# Patient Record
Sex: MALE | Race: ASIAN | NOT HISPANIC OR LATINO
[De-identification: names, ages, dates, MRNs, and addresses within clinical notes are randomized per-mention and may not be internally consistent; named-entity substitution may affect disease eponyms.]

---

## 2019-11-06 ENCOUNTER — APPOINTMENT (OUTPATIENT)
Dept: CARDIOLOGY | Facility: CLINIC | Age: 49
End: 2019-11-06
Payer: COMMERCIAL

## 2019-11-06 VITALS
BODY MASS INDEX: 27.83 KG/M2 | WEIGHT: 210 LBS | SYSTOLIC BLOOD PRESSURE: 124 MMHG | RESPIRATION RATE: 16 BRPM | HEIGHT: 73 IN | HEART RATE: 59 BPM | DIASTOLIC BLOOD PRESSURE: 76 MMHG

## 2019-11-06 DIAGNOSIS — I42.1 OBSTRUCTIVE HYPERTROPHIC CARDIOMYOPATHY: ICD-10-CM

## 2019-11-06 DIAGNOSIS — I10 ESSENTIAL (PRIMARY) HYPERTENSION: ICD-10-CM

## 2019-11-06 DIAGNOSIS — E11.9 TYPE 2 DIABETES MELLITUS W/OUT COMPLICATIONS: ICD-10-CM

## 2019-11-06 PROBLEM — Z00.00 ENCOUNTER FOR PREVENTIVE HEALTH EXAMINATION: Status: ACTIVE | Noted: 2019-11-06

## 2019-11-06 PROCEDURE — 99204 OFFICE O/P NEW MOD 45 MIN: CPT

## 2019-11-06 RX ORDER — CARVEDILOL 25 MG/1
25 TABLET, FILM COATED ORAL
Refills: 0 | Status: ACTIVE | COMMUNITY
Start: 2019-11-06

## 2019-11-06 RX ORDER — METFORMIN HYDROCHLORIDE 500 MG/1
500 TABLET, COATED ORAL
Refills: 0 | Status: ACTIVE | COMMUNITY
Start: 2019-11-06

## 2019-11-06 RX ORDER — AMLODIPINE BESYLATE 10 MG/1
10 TABLET ORAL
Refills: 0 | Status: ACTIVE | COMMUNITY
Start: 2019-11-06

## 2019-11-06 RX ORDER — ASPIRIN 81 MG/1
81 TABLET ORAL
Refills: 0 | Status: ACTIVE | COMMUNITY
Start: 2019-11-06

## 2019-11-06 NOTE — HISTORY OF PRESENT ILLNESS
[FreeTextEntry1] : 1. HTN: on medications.\par 2. HCM: patient told he had HCM in 2007. Has been on BB since.\par He also had cath in 2007 that showed non obstructive CAD.\par He recently had nephrectomy.\par He has mild SOB with exertion and intermittent chest pressure without radiation lasting minutes. ET is limited post surgery.\par Symptoms have been present for about two months and occur several times/ week.

## 2019-11-06 NOTE — REASON FOR VISIT
[Initial Evaluation] : an initial evaluation of [Hypertension] : hypertension [FreeTextEntry1] : 49 M HTN DM ? hx of HOCM with CP and SOB.

## 2019-11-06 NOTE — PHYSICAL EXAM
[General Appearance - Well Developed] : well developed [Normal Appearance] : normal appearance [Well Groomed] : well groomed [General Appearance - Well Nourished] : well nourished [No Deformities] : no deformities [General Appearance - In No Acute Distress] : no acute distress [Normal Conjunctiva] : the conjunctiva exhibited no abnormalities [Eyelids - No Xanthelasma] : the eyelids demonstrated no xanthelasmas [Normal Oral Mucosa] : normal oral mucosa [No Oral Pallor] : no oral pallor [No Oral Cyanosis] : no oral cyanosis [Normal Jugular Venous A Waves Present] : normal jugular venous A waves present [Normal Jugular Venous V Waves Present] : normal jugular venous V waves present [No Jugular Venous Lino A Waves] : no jugular venous lino A waves [Heart Rate And Rhythm] : heart rate and rhythm were normal [Heart Sounds] : normal S1 and S2 [FreeTextEntry1] : 2/6 EMILIE NAPIER  [Respiration, Rhythm And Depth] : normal respiratory rhythm and effort [Exaggerated Use Of Accessory Muscles For Inspiration] : no accessory muscle use [Auscultation Breath Sounds / Voice Sounds] : lungs were clear to auscultation bilaterally [Abdomen Soft] : soft [Abdomen Tenderness] : non-tender [Abdomen Mass (___ Cm)] : no abdominal mass palpated [Nail Clubbing] : no clubbing of the fingernails [Cyanosis, Localized] : no localized cyanosis [Petechial Hemorrhages (___cm)] : no petechial hemorrhages [Skin Color & Pigmentation] : normal skin color and pigmentation [] : no rash [No Venous Stasis] : no venous stasis [Skin Lesions] : no skin lesions [No Skin Ulcers] : no skin ulcer [No Xanthoma] : no  xanthoma was observed [Oriented To Time, Place, And Person] : oriented to person, place, and time [Affect] : the affect was normal [Mood] : the mood was normal [No Anxiety] : not feeling anxious

## 2019-11-06 NOTE — DISCUSSION/SUMMARY
[FreeTextEntry1] : 49 M HCM HTN DM with CP and SOB.\par CHECK ECHOCARDIOGRAM.\par Continue meds for HTN.\par Continue ASA.

## 2019-11-20 ENCOUNTER — APPOINTMENT (OUTPATIENT)
Dept: CARDIOLOGY | Facility: CLINIC | Age: 49
End: 2019-11-20
Payer: COMMERCIAL

## 2019-11-20 VITALS
WEIGHT: 212 LBS | RESPIRATION RATE: 12 BRPM | BODY MASS INDEX: 28.1 KG/M2 | HEART RATE: 61 BPM | HEIGHT: 73 IN | DIASTOLIC BLOOD PRESSURE: 80 MMHG | SYSTOLIC BLOOD PRESSURE: 132 MMHG

## 2019-11-20 DIAGNOSIS — E78.5 HYPERLIPIDEMIA, UNSPECIFIED: ICD-10-CM

## 2019-11-20 PROCEDURE — 99214 OFFICE O/P EST MOD 30 MIN: CPT

## 2019-11-20 RX ORDER — LOSARTAN POTASSIUM AND HYDROCHLOROTHIAZIDE 100; 12.5 MG/1; MG/1
100-12.5 TABLET, FILM COATED ORAL
Refills: 0 | Status: ACTIVE | COMMUNITY
Start: 2019-11-06

## 2019-11-20 NOTE — DISCUSSION/SUMMARY
[FreeTextEntry1] : 49 M HTN hyperlipidemia DM HCM for f/u.\par Patient's echo reviewed.\par Continue current medications for HTN and hyperlipidemia.\par Continue DM control.\par Patient will have f/u in [facility] area. Suggested they may consider stopping amlodipine if BP well controlled there.

## 2019-11-20 NOTE — REASON FOR VISIT
[Follow-Up - Clinic] : a clinic follow-up of [Hyperlipidemia] : hyperlipidemia [Hypertension] : hypertension [FreeTextEntry1] : 49 M HTN hyperlipidemia DM hx of HOCM for f/u.\par

## 2019-11-20 NOTE — HISTORY OF PRESENT ILLNESS
[FreeTextEntry1] : 1. HTN: on medications.\par 2. HCM: patient told he had HCM in 2007. Has been on BB since.\par He also had cath in 2007 that showed non obstructive CAD.\par He recently had nephrectomy for RCC.\par 3. Hyperlipidemia: on statin.

## 2019-11-20 NOTE — PHYSICAL EXAM
[General Appearance - Well Developed] : well developed [Normal Appearance] : normal appearance [No Deformities] : no deformities [Well Groomed] : well groomed [General Appearance - Well Nourished] : well nourished [Eyelids - No Xanthelasma] : the eyelids demonstrated no xanthelasmas [General Appearance - In No Acute Distress] : no acute distress [Normal Conjunctiva] : the conjunctiva exhibited no abnormalities [Normal Oral Mucosa] : normal oral mucosa [No Oral Pallor] : no oral pallor [No Oral Cyanosis] : no oral cyanosis [Normal Jugular Venous A Waves Present] : normal jugular venous A waves present [Normal Jugular Venous V Waves Present] : normal jugular venous V waves present [No Jugular Venous Lino A Waves] : no jugular venous lino A waves [Respiration, Rhythm And Depth] : normal respiratory rhythm and effort [Exaggerated Use Of Accessory Muscles For Inspiration] : no accessory muscle use [Auscultation Breath Sounds / Voice Sounds] : lungs were clear to auscultation bilaterally [Heart Sounds] : normal S1 and S2 [FreeTextEntry1] : 2/6 EMILIE NAPIER  [Heart Rate And Rhythm] : heart rate and rhythm were normal [Abdomen Tenderness] : non-tender [Abdomen Soft] : soft [Abdomen Mass (___ Cm)] : no abdominal mass palpated [Nail Clubbing] : no clubbing of the fingernails [Cyanosis, Localized] : no localized cyanosis [Petechial Hemorrhages (___cm)] : no petechial hemorrhages [] : no rash [Skin Color & Pigmentation] : normal skin color and pigmentation [No Venous Stasis] : no venous stasis [Skin Lesions] : no skin lesions [No Skin Ulcers] : no skin ulcer [No Xanthoma] : no  xanthoma was observed [Oriented To Time, Place, And Person] : oriented to person, place, and time [Mood] : the mood was normal [No Anxiety] : not feeling anxious [Affect] : the affect was normal

## 2021-06-27 ENCOUNTER — INPATIENT (INPATIENT)
Facility: HOSPITAL | Age: 51
LOS: 2 days | Discharge: HOME | DRG: 637 | End: 2021-06-30
Attending: INTERNAL MEDICINE | Admitting: STUDENT IN AN ORGANIZED HEALTH CARE EDUCATION/TRAINING PROGRAM
Payer: COMMERCIAL

## 2021-06-27 VITALS
WEIGHT: 220.02 LBS | HEART RATE: 67 BPM | OXYGEN SATURATION: 98 % | RESPIRATION RATE: 18 BRPM | TEMPERATURE: 98 F | HEIGHT: 73 IN | SYSTOLIC BLOOD PRESSURE: 198 MMHG | DIASTOLIC BLOOD PRESSURE: 83 MMHG

## 2021-06-27 DIAGNOSIS — E16.2 HYPOGLYCEMIA, UNSPECIFIED: ICD-10-CM

## 2021-06-27 LAB
ALBUMIN SERPL ELPH-MCNC: 3.7 G/DL — SIGNIFICANT CHANGE UP (ref 3.3–5)
ALP SERPL-CCNC: 51 U/L — SIGNIFICANT CHANGE UP (ref 40–120)
ALT FLD-CCNC: 37 U/L — SIGNIFICANT CHANGE UP (ref 10–45)
ANION GAP SERPL CALC-SCNC: 15 MMOL/L — SIGNIFICANT CHANGE UP (ref 5–17)
AST SERPL-CCNC: 25 U/L — SIGNIFICANT CHANGE UP (ref 10–40)
BASOPHILS # BLD AUTO: 0.02 K/UL — SIGNIFICANT CHANGE UP (ref 0–0.2)
BASOPHILS NFR BLD AUTO: 0.2 % — SIGNIFICANT CHANGE UP (ref 0–2)
BILIRUB SERPL-MCNC: 0.4 MG/DL — SIGNIFICANT CHANGE UP (ref 0.2–1.2)
BUN SERPL-MCNC: 70 MG/DL — HIGH (ref 7–23)
CALCIUM SERPL-MCNC: 8.7 MG/DL — SIGNIFICANT CHANGE UP (ref 8.4–10.5)
CHLORIDE SERPL-SCNC: 98 MMOL/L — SIGNIFICANT CHANGE UP (ref 96–108)
CO2 SERPL-SCNC: 21 MMOL/L — LOW (ref 22–31)
CREAT SERPL-MCNC: 2.43 MG/DL — HIGH (ref 0.5–1.3)
EOSINOPHIL # BLD AUTO: 0 K/UL — SIGNIFICANT CHANGE UP (ref 0–0.5)
EOSINOPHIL NFR BLD AUTO: 0 % — SIGNIFICANT CHANGE UP (ref 0–6)
GLUCOSE SERPL-MCNC: 152 MG/DL — HIGH (ref 70–99)
HCT VFR BLD CALC: 40.9 % — SIGNIFICANT CHANGE UP (ref 39–50)
HGB BLD-MCNC: 13.7 G/DL — SIGNIFICANT CHANGE UP (ref 13–17)
IMM GRANULOCYTES NFR BLD AUTO: 1.9 % — HIGH (ref 0–1.5)
LYMPHOCYTES # BLD AUTO: 0.53 K/UL — LOW (ref 1–3.3)
LYMPHOCYTES # BLD AUTO: 4.8 % — LOW (ref 13–44)
MCHC RBC-ENTMCNC: 29.1 PG — SIGNIFICANT CHANGE UP (ref 27–34)
MCHC RBC-ENTMCNC: 33.5 GM/DL — SIGNIFICANT CHANGE UP (ref 32–36)
MCV RBC AUTO: 86.8 FL — SIGNIFICANT CHANGE UP (ref 80–100)
MONOCYTES # BLD AUTO: 0.3 K/UL — SIGNIFICANT CHANGE UP (ref 0–0.9)
MONOCYTES NFR BLD AUTO: 2.7 % — SIGNIFICANT CHANGE UP (ref 2–14)
NEUTROPHILS # BLD AUTO: 9.96 K/UL — HIGH (ref 1.8–7.4)
NEUTROPHILS NFR BLD AUTO: 90.4 % — HIGH (ref 43–77)
NRBC # BLD: 0 /100 WBCS — SIGNIFICANT CHANGE UP (ref 0–0)
PLATELET # BLD AUTO: 264 K/UL — SIGNIFICANT CHANGE UP (ref 150–400)
POTASSIUM SERPL-MCNC: 3.8 MMOL/L — SIGNIFICANT CHANGE UP (ref 3.5–5.3)
POTASSIUM SERPL-SCNC: 3.8 MMOL/L — SIGNIFICANT CHANGE UP (ref 3.5–5.3)
PROT SERPL-MCNC: 6.2 G/DL — SIGNIFICANT CHANGE UP (ref 6–8.3)
RBC # BLD: 4.71 M/UL — SIGNIFICANT CHANGE UP (ref 4.2–5.8)
RBC # FLD: 12.8 % — SIGNIFICANT CHANGE UP (ref 10.3–14.5)
SARS-COV-2 RNA SPEC QL NAA+PROBE: SIGNIFICANT CHANGE UP
SODIUM SERPL-SCNC: 134 MMOL/L — LOW (ref 135–145)
WBC # BLD: 11.02 K/UL — HIGH (ref 3.8–10.5)
WBC # FLD AUTO: 11.02 K/UL — HIGH (ref 3.8–10.5)

## 2021-06-27 PROCEDURE — 99223 1ST HOSP IP/OBS HIGH 75: CPT

## 2021-06-27 PROCEDURE — 93010 ELECTROCARDIOGRAM REPORT: CPT

## 2021-06-27 PROCEDURE — 99285 EMERGENCY DEPT VISIT HI MDM: CPT

## 2021-06-27 RX ORDER — DEXTROSE 50 % IN WATER 50 %
50 SYRINGE (ML) INTRAVENOUS ONCE
Refills: 0 | Status: COMPLETED | OUTPATIENT
Start: 2021-06-27 | End: 2021-06-27

## 2021-06-27 RX ORDER — SODIUM CHLORIDE 9 MG/ML
1000 INJECTION, SOLUTION INTRAVENOUS
Refills: 0 | Status: COMPLETED | OUTPATIENT
Start: 2021-06-27 | End: 2021-06-28

## 2021-06-27 RX ORDER — OCTREOTIDE ACETATE 200 UG/ML
50 INJECTION, SOLUTION INTRAVENOUS; SUBCUTANEOUS ONCE
Refills: 0 | Status: COMPLETED | OUTPATIENT
Start: 2021-06-27 | End: 2021-06-27

## 2021-06-27 RX ORDER — SODIUM CHLORIDE 9 MG/ML
1000 INJECTION, SOLUTION INTRAVENOUS
Refills: 0 | Status: DISCONTINUED | OUTPATIENT
Start: 2021-06-27 | End: 2021-06-28

## 2021-06-27 RX ORDER — CARVEDILOL PHOSPHATE 80 MG/1
25 CAPSULE, EXTENDED RELEASE ORAL ONCE
Refills: 0 | Status: DISCONTINUED | OUTPATIENT
Start: 2021-06-27 | End: 2021-06-28

## 2021-06-27 RX ADMIN — Medication 50 MILLILITER(S): at 20:32

## 2021-06-27 RX ADMIN — OCTREOTIDE ACETATE 50 MICROGRAM(S): 200 INJECTION, SOLUTION INTRAVENOUS; SUBCUTANEOUS at 23:08

## 2021-06-27 RX ADMIN — SODIUM CHLORIDE 1000 MILLILITER(S): 9 INJECTION, SOLUTION INTRAVENOUS at 20:30

## 2021-06-27 NOTE — H&P ADULT - ASSESSMENT
51 yo M with PMH of HTN, HLD, Hypothyroidism, DMII, Renal cell ca s/p right nephrectomy, currently on prednisone, CKD, ?irregular heart beats, presents here after syncopal episode.

## 2021-06-27 NOTE — ED ADULT NURSE NOTE - OBJECTIVE STATEMENT
51y/o male aaox4 h/o kidney CA, borderline DM  presents to the ED via EMS from home c/o hypoglycemia . Pt states that today while he was driving to his family house he felt disoriented, at his family house sudden he felt dizzy and  LOC,  family called EMS. As per pt states , "I think I was dreaming, when I saw the EMS" pt felt disoriented, lightheaded. AS per EMS when arrived  to pt's house pt was disoriented , FS 51 given 250 ML /D10, repeated Fs w/ 185 and brought to Ed for evaluation, Pt reports taking prednisone 30mg per day last dose was this morning.   Pt at this time denies fever, chills, n/v, abd pain, diarrhea/constipation, numbness/tingling, urinary symptoms , in no respiratory distress, no CP, PT safety maintained with family at bedside, call bell within reach and bed in the lowest position.

## 2021-06-27 NOTE — ED ADULT NURSE REASSESSMENT NOTE - NS ED NURSE REASSESS COMMENT FT1
pt c/o feeling nauseated and diaprotetic and dizzy. FS 36. 1amp D50 given to pt and IVF started. will reassess FS in 15 mins. MD Omi Rajput aware.

## 2021-06-27 NOTE — ED PROVIDER NOTE - CLINICAL SUMMARY MEDICAL DECISION MAKING FREE TEXT BOX
50y male with history of renal CA s/p kidney resection and now with worsening kidney disease on glipizide with episode of hypoglycemia. Likely from glipizide, decreased clearance because of decreased kidney function. Will check kidney function and obs. 50y male with history of renal CA s/p kidney resection and now with worsening kidney disease on glipizide with episode of hypoglycemia. Likely from glipizide, decreased clearance because of decreased kidney function. Will check kidney function and obs. ZR

## 2021-06-27 NOTE — ED PROVIDER NOTE - NS ED ROS FT
Gen: No fever, No chills  Eyes: No vision changes   ENT: No congestion, sore throat  Resp: No cough. No SOB  Cardiovascular: No chest pain. No palpitations  GI: No abdominal pain, nausea/vomiting   :  No change in urine output or frequency; no dysuria  MS: No joint. No muscle pain  Skin: No rashes  Neuro: No headache; No numbness or weakness  Remainder negative, except as per the HPI

## 2021-06-27 NOTE — H&P ADULT - NSICDXPASTMEDICALHX_GEN_ALL_CORE_FT
PAST MEDICAL HISTORY:  DM2 (diabetes mellitus, type 2)     HLD (hyperlipidemia)     Hypothyroidism     Renal cell cancer

## 2021-06-27 NOTE — H&P ADULT - NSHPLABSRESULTS_GEN_ALL_CORE
Labs personally reviewed:                          13.7   11.02 )-----------( 264      ( 27 Jun 2021 19:10 )             40.9     06-27    134<L>  |  98  |  70<H>  ----------------------------<  152<H>  3.8   |  21<L>  |  2.43<H>    Ca    8.7      27 Jun 2021 19:10    TPro  6.2  /  Alb  3.7  /  TBili  0.4  /  DBili  x   /  AST  25  /  ALT  37  /  AlkPhos  51  06-27        LIVER FUNCTIONS - ( 27 Jun 2021 19:10 )  Alb: 3.7 g/dL / Pro: 6.2 g/dL / ALK PHOS: 51 U/L / ALT: 37 U/L / AST: 25 U/L / GGT: x               CAPILLARY BLOOD GLUCOSE      POCT Blood Glucose.: 70 mg/dL (28 Jun 2021 00:10)  POCT Blood Glucose.: 128 mg/dL (27 Jun 2021 22:57)  POCT Blood Glucose.: 210 mg/dL (27 Jun 2021 20:52)  POCT Blood Glucose.: 36 mg/dL (27 Jun 2021 20:31)  POCT Blood Glucose.: 136 mg/dL (27 Jun 2021 18:41)  POCT Blood Glucose.: 185 mg/dL (27 Jun 2021 18:27)      Imaging:  CXR personally reviewed: no focal opacity    EKG personally reviewed: nsr, no acute st changes

## 2021-06-27 NOTE — ED PROVIDER NOTE - OBJECTIVE STATEMENT
50y male with PMH of renal CA s/p right kidney resection, mets s/p immunotherapy presenting with hypoglycemia. States that he had felt disoriented prior to passing out, finger stick 51 on scene, was given 250ml of D10 and was back to baseline. States that his Cr has been getting worse since immunotherapy. Takes glipizide BID, last took this AM. No associated symptoms.

## 2021-06-27 NOTE — H&P ADULT - NSHPREVIEWOFSYSTEMS_GEN_ALL_CORE
CONSTITUTIONAL: No weakness, fevers or chills  EYES/ENT: No visual changes;  No dysphagia  NECK: No pain or stiffness  RESPIRATORY: No cough, wheezing, hemoptysis; No shortness of breath  CARDIOVASCULAR: No chest pain or palpitations; No lower extremity edema  EXTREMITIES: no le edema, cyanosis, clubbing  MUSCULOSKELETAL: no joint pain, swelling  GASTROINTESTINAL: No abdominal or epigastric pain. No nausea, vomiting, or hematemesis; No diarrhea or constipation. No melena or hematochezia.  BACK: No back pain  GENITOURINARY: No dysuria, frequency or hematuria  NEUROLOGICAL: No numbness or weakness  SKIN: No itching, burning, rashes, or lesions   PSYCH: no agitation  All other review of systems is negative unless indicated above.

## 2021-06-27 NOTE — H&P ADULT - PROBLEM SELECTOR PLAN 1
Patient with recurrent hypoglycemia in setting of sulfonylurea use, no intentional overdose   --> 128 --> 70  s/o octreotide 50mcg x 1; will start octreotide gtt at 50mcg/hr for now  patient is also on prednisone  start D5W @75 cc/hr for now  monitor FS q1hr until 3 normal values  hold glipizide  endocrine consult in AM

## 2021-06-27 NOTE — ED PROVIDER NOTE - PHYSICAL EXAMINATION
Gen: AAOx3, non-toxic  Head: NCAT  HEENT: oral mucosa moist, normal conjunctiva  Lung: CTAB, no respiratory distress, speaking in full sentences  CV: RRR, no murmurs, rubs or gallops  Abd: soft, NTND, no guarding   MSK: no visible deformities  Neuro: No focal sensory or motor deficits  Skin: Warm, well perfused, no rash  Psych: normal affect.   ~Deon Jasbir PGY3

## 2021-06-27 NOTE — H&P ADULT - PROBLEM SELECTOR PLAN 5
s/p right nephrectomy  was on immunotherapy, that was recently discontinued, currently on prednisone taper  monitor for now  f/u as outaptient

## 2021-06-27 NOTE — H&P ADULT - HISTORY OF PRESENT ILLNESS
49 yo M with PMH of HTN, HLD, Hypothyroidism, DMII, Renal cell ca s/p right nephrectomy, CKD, ?irregular heart beats, presents here after syncopal episode.  Patient was at a [employer] party today when he started feeling sweaty, lightheaded, overall not feeling well.  He then lay down on the couch to rest, next thing he remembers is EMS was there trying to wake up him.  He was told that he passed out.  His finger stick at the time was 51.  He was given D50.  In ED, patient had another episode of sweating and lightheadedness.  Repeat blood sugar was 36.  Patient was then given 1L D5 NS, octreotide.  Currently patient denies any complaints.  His FS at this time is 128.  Patient otherwise denies fever, chills, cough, SOB, nausea, vomiting, diarrhea.  He never had syncopal episode in the past.  He denies having chest pain, palpitation, prior to the syncopal episode.   51 yo M with PMH of HTN, HLD, Hypothyroidism, DMII, Renal cell ca s/p right nephrectomy, currently on prednisone, CKD, ?irregular heart beats, presents here after syncopal episode.  Patient was at a [employer] party today when he started feeling sweaty, lightheaded, overall not feeling well.  He then lay down on the couch to rest, next thing he remembers is EMS was there trying to wake up him.  He was told that he passed out.  His finger stick at the time was 51.  He was given D50.  In ED, patient had another episode of sweating and lightheadedness.  Repeat blood sugar was 36.  Patient was then given 1L D5 NS, octreotide.  Currently patient denies any complaints.  His FS at this time is 128.  Patient otherwise denies fever, chills, cough, SOB, nausea, vomiting, diarrhea.  He never had syncopal episode in the past.  He denies having chest pain, palpitation, prior to the syncopal episode.   51 yo M with PMH of HTN, HLD, Hypothyroidism, DMII, Renal cell ca s/p right nephrectomy, currently on prednisone, CKD, ?irregular heart beats, presents here after syncopal episode.  Patient was at a [employer] party today when he started feeling sweaty, lightheaded, overall not feeling well.  He then lay down on the couch to rest, next thing he remembers is EMS was there trying to wake up him.  He was told that he passed out.  His finger stick at the time was 51.  In ED, patient had another episode of sweating and lightheadedness.  Repeat blood sugar was 36.  Patient was then given 1L D5 NS, octreotide.  Currently patient denies any complaints.  His FS at this time is 128.  Patient otherwise denies fever, chills, cough, SOB, nausea, vomiting, diarrhea.  He never had syncopal episode in the past.  He denies having chest pain, palpitation, prior to the syncopal episode.

## 2021-06-27 NOTE — ED ADULT TRIAGE NOTE - ESI TRIAGE ACUITY LEVEL, MLM
celeste's Personal History/Story    SEEN IN [facility] SINCE 3/2012. CHRONIC BACK PAIN MANY YEARS. NO INJURY OR   TRAUMA.    HX OF INJECTIONS ORAL PAINMEDS  What Should We Know About You or Your Family to Care for You    SINGLE, HAS A DOG   LIVE WITH A PERSONAL ASS
3

## 2021-06-27 NOTE — H&P ADULT - NSHPPHYSICALEXAM_GEN_ALL_CORE
PHYSICAL EXAM:  Vital Signs Last 24 Hrs  T(C): 36.6 (06-27-21 @ 22:13)  T(F): 97.8 (06-27-21 @ 22:13), Max: 98 (06-27-21 @ 18:26)  HR: 58 (06-28-21 @ 00:07) (58 - 72)  BP: 157/90 (06-28-21 @ 00:07)  BP(mean): --  RR: 18 (06-28-21 @ 00:07) (16 - 18)  SpO2: 99% (06-28-21 @ 00:07) (98% - 100%)  Wt(kg): --    Constitutional: NAD, awake and alert  EYES: EOMI  ENT:  Normal Hearing, no tonsillar exudates   Neck: Soft and supple, No JVD  Lungs: Breath sounds are clear bilaterally, No wheezing, rales or rhonchi  Heart: S1 and S2, regular rate and rhythm, no Murmurs, gallops or rubs  Abdomen: Bowel Sounds present, soft, nontender, nondistended, no guarding, no rebound  Extremities: No cyanosis or clubbing; warm to touch  Vascular: 2+ peripheral pulses lower ex  Neurological: A/O x 3, no focal deficits  Musculoskeletal: 5/5 strength b/l upper and lower extremities  Skin: No rashes  Psych: no depression or anhedonia  HEME: no bruises, no nose bleeds

## 2021-06-27 NOTE — H&P ADULT - PROBLEM SELECTOR PLAN 3
creatinine improving per patient  creatinine 2.9 on June 14, prior to that was 3.6  monitor BMP closely  c/w home meds including losartan

## 2021-06-28 DIAGNOSIS — E78.5 HYPERLIPIDEMIA, UNSPECIFIED: ICD-10-CM

## 2021-06-28 DIAGNOSIS — C64.9 MALIGNANT NEOPLASM OF UNSPECIFIED KIDNEY, EXCEPT RENAL PELVIS: ICD-10-CM

## 2021-06-28 DIAGNOSIS — Z90.5 ACQUIRED ABSENCE OF KIDNEY: Chronic | ICD-10-CM

## 2021-06-28 DIAGNOSIS — E16.2 HYPOGLYCEMIA, UNSPECIFIED: ICD-10-CM

## 2021-06-28 DIAGNOSIS — N18.9 CHRONIC KIDNEY DISEASE, UNSPECIFIED: ICD-10-CM

## 2021-06-28 DIAGNOSIS — Z98.890 OTHER SPECIFIED POSTPROCEDURAL STATES: Chronic | ICD-10-CM

## 2021-06-28 DIAGNOSIS — E03.9 HYPOTHYROIDISM, UNSPECIFIED: ICD-10-CM

## 2021-06-28 DIAGNOSIS — E11.9 TYPE 2 DIABETES MELLITUS WITHOUT COMPLICATIONS: ICD-10-CM

## 2021-06-28 DIAGNOSIS — Z29.9 ENCOUNTER FOR PROPHYLACTIC MEASURES, UNSPECIFIED: ICD-10-CM

## 2021-06-28 LAB
ALBUMIN SERPL ELPH-MCNC: 2.8 G/DL — LOW (ref 3.3–5)
ALP SERPL-CCNC: 38 U/L — LOW (ref 40–120)
ALT FLD-CCNC: 27 U/L — SIGNIFICANT CHANGE UP (ref 10–45)
ANION GAP SERPL CALC-SCNC: 13 MMOL/L — SIGNIFICANT CHANGE UP (ref 5–17)
AST SERPL-CCNC: 16 U/L — SIGNIFICANT CHANGE UP (ref 10–40)
BASOPHILS # BLD AUTO: 0.01 K/UL — SIGNIFICANT CHANGE UP (ref 0–0.2)
BASOPHILS NFR BLD AUTO: 0.1 % — SIGNIFICANT CHANGE UP (ref 0–2)
BILIRUB SERPL-MCNC: 0.5 MG/DL — SIGNIFICANT CHANGE UP (ref 0.2–1.2)
BUN SERPL-MCNC: 62 MG/DL — HIGH (ref 7–23)
CALCIUM SERPL-MCNC: 7.9 MG/DL — LOW (ref 8.4–10.5)
CHLORIDE SERPL-SCNC: 106 MMOL/L — SIGNIFICANT CHANGE UP (ref 96–108)
CO2 SERPL-SCNC: 18 MMOL/L — LOW (ref 22–31)
CREAT SERPL-MCNC: 2.5 MG/DL — HIGH (ref 0.5–1.3)
EOSINOPHIL # BLD AUTO: 0.03 K/UL — SIGNIFICANT CHANGE UP (ref 0–0.5)
EOSINOPHIL NFR BLD AUTO: 0.3 % — SIGNIFICANT CHANGE UP (ref 0–6)
GLUCOSE BLDC GLUCOMTR-MCNC: 225 MG/DL — HIGH (ref 70–99)
GLUCOSE BLDC GLUCOMTR-MCNC: 277 MG/DL — HIGH (ref 70–99)
GLUCOSE BLDC GLUCOMTR-MCNC: 304 MG/DL — HIGH (ref 70–99)
GLUCOSE SERPL-MCNC: 171 MG/DL — HIGH (ref 70–99)
HCT VFR BLD CALC: 34 % — LOW (ref 39–50)
HGB BLD-MCNC: 11.6 G/DL — LOW (ref 13–17)
IMM GRANULOCYTES NFR BLD AUTO: 1.2 % — SIGNIFICANT CHANGE UP (ref 0–1.5)
LYMPHOCYTES # BLD AUTO: 1.89 K/UL — SIGNIFICANT CHANGE UP (ref 1–3.3)
LYMPHOCYTES # BLD AUTO: 20.1 % — SIGNIFICANT CHANGE UP (ref 13–44)
MAGNESIUM SERPL-MCNC: 2.1 MG/DL — SIGNIFICANT CHANGE UP (ref 1.6–2.6)
MCHC RBC-ENTMCNC: 29.7 PG — SIGNIFICANT CHANGE UP (ref 27–34)
MCHC RBC-ENTMCNC: 34.1 GM/DL — SIGNIFICANT CHANGE UP (ref 32–36)
MCV RBC AUTO: 87.2 FL — SIGNIFICANT CHANGE UP (ref 80–100)
MONOCYTES # BLD AUTO: 0.8 K/UL — SIGNIFICANT CHANGE UP (ref 0–0.9)
MONOCYTES NFR BLD AUTO: 8.5 % — SIGNIFICANT CHANGE UP (ref 2–14)
NEUTROPHILS # BLD AUTO: 6.57 K/UL — SIGNIFICANT CHANGE UP (ref 1.8–7.4)
NEUTROPHILS NFR BLD AUTO: 69.8 % — SIGNIFICANT CHANGE UP (ref 43–77)
NRBC # BLD: 0 /100 WBCS — SIGNIFICANT CHANGE UP (ref 0–0)
PHOSPHATE SERPL-MCNC: 4.4 MG/DL — SIGNIFICANT CHANGE UP (ref 2.5–4.5)
PLATELET # BLD AUTO: 222 K/UL — SIGNIFICANT CHANGE UP (ref 150–400)
POTASSIUM SERPL-MCNC: 3.6 MMOL/L — SIGNIFICANT CHANGE UP (ref 3.5–5.3)
POTASSIUM SERPL-SCNC: 3.6 MMOL/L — SIGNIFICANT CHANGE UP (ref 3.5–5.3)
PROT SERPL-MCNC: 4.8 G/DL — LOW (ref 6–8.3)
RBC # BLD: 3.9 M/UL — LOW (ref 4.2–5.8)
RBC # FLD: 13.1 % — SIGNIFICANT CHANGE UP (ref 10.3–14.5)
SODIUM SERPL-SCNC: 137 MMOL/L — SIGNIFICANT CHANGE UP (ref 135–145)
WBC # BLD: 9.41 K/UL — SIGNIFICANT CHANGE UP (ref 3.8–10.5)
WBC # FLD AUTO: 9.41 K/UL — SIGNIFICANT CHANGE UP (ref 3.8–10.5)

## 2021-06-28 PROCEDURE — 99223 1ST HOSP IP/OBS HIGH 75: CPT

## 2021-06-28 PROCEDURE — 93010 ELECTROCARDIOGRAM REPORT: CPT

## 2021-06-28 RX ORDER — BACITRACIN ZINC 500 UNIT/G
1 OINTMENT IN PACKET (EA) TOPICAL
Refills: 0 | Status: DISCONTINUED | OUTPATIENT
Start: 2021-06-28 | End: 2021-06-30

## 2021-06-28 RX ORDER — CARVEDILOL PHOSPHATE 80 MG/1
0 CAPSULE, EXTENDED RELEASE ORAL
Qty: 0 | Refills: 0 | DISCHARGE

## 2021-06-28 RX ORDER — OCTREOTIDE ACETATE 200 UG/ML
50 INJECTION, SOLUTION INTRAVENOUS; SUBCUTANEOUS
Qty: 500 | Refills: 0 | Status: DISCONTINUED | OUTPATIENT
Start: 2021-06-28 | End: 2021-06-28

## 2021-06-28 RX ORDER — INSULIN LISPRO 100/ML
VIAL (ML) SUBCUTANEOUS EVERY 4 HOURS
Refills: 0 | Status: DISCONTINUED | OUTPATIENT
Start: 2021-06-28 | End: 2021-06-29

## 2021-06-28 RX ORDER — ATORVASTATIN CALCIUM 80 MG/1
1 TABLET, FILM COATED ORAL
Qty: 0 | Refills: 0 | DISCHARGE

## 2021-06-28 RX ORDER — LOSARTAN POTASSIUM 100 MG/1
50 TABLET, FILM COATED ORAL DAILY
Refills: 0 | Status: DISCONTINUED | OUTPATIENT
Start: 2021-06-28 | End: 2021-06-29

## 2021-06-28 RX ORDER — LOSARTAN POTASSIUM 100 MG/1
0 TABLET, FILM COATED ORAL
Qty: 0 | Refills: 0 | DISCHARGE

## 2021-06-28 RX ORDER — GABAPENTIN 400 MG/1
100 CAPSULE ORAL DAILY
Refills: 0 | Status: DISCONTINUED | OUTPATIENT
Start: 2021-06-28 | End: 2021-06-30

## 2021-06-28 RX ORDER — CYCLOSPORINE 0.5 MG/ML
0 EMULSION OPHTHALMIC
Qty: 0 | Refills: 0 | DISCHARGE

## 2021-06-28 RX ORDER — DEXTROSE 50 % IN WATER 50 %
50 SYRINGE (ML) INTRAVENOUS ONCE
Refills: 0 | Status: COMPLETED | OUTPATIENT
Start: 2021-06-28 | End: 2021-06-28

## 2021-06-28 RX ORDER — HYDROXYZINE HCL 10 MG
1 TABLET ORAL
Qty: 0 | Refills: 0 | DISCHARGE

## 2021-06-28 RX ORDER — DEXTROSE 50 % IN WATER 50 %
25 SYRINGE (ML) INTRAVENOUS ONCE
Refills: 0 | Status: DISCONTINUED | OUTPATIENT
Start: 2021-06-28 | End: 2021-06-30

## 2021-06-28 RX ORDER — ASPIRIN/CALCIUM CARB/MAGNESIUM 324 MG
81 TABLET ORAL DAILY
Refills: 0 | Status: DISCONTINUED | OUTPATIENT
Start: 2021-06-28 | End: 2021-06-30

## 2021-06-28 RX ORDER — SODIUM CHLORIDE 9 MG/ML
1000 INJECTION, SOLUTION INTRAVENOUS
Refills: 0 | Status: DISCONTINUED | OUTPATIENT
Start: 2021-06-28 | End: 2021-06-30

## 2021-06-28 RX ORDER — DEXTROSE 50 % IN WATER 50 %
15 SYRINGE (ML) INTRAVENOUS ONCE
Refills: 0 | Status: DISCONTINUED | OUTPATIENT
Start: 2021-06-28 | End: 2021-06-30

## 2021-06-28 RX ORDER — GLUCAGON INJECTION, SOLUTION 0.5 MG/.1ML
1 INJECTION, SOLUTION SUBCUTANEOUS ONCE
Refills: 0 | Status: DISCONTINUED | OUTPATIENT
Start: 2021-06-28 | End: 2021-06-30

## 2021-06-28 RX ORDER — AMLODIPINE BESYLATE 2.5 MG/1
10 TABLET ORAL DAILY
Refills: 0 | Status: DISCONTINUED | OUTPATIENT
Start: 2021-06-28 | End: 2021-06-30

## 2021-06-28 RX ORDER — ATORVASTATIN CALCIUM 80 MG/1
0 TABLET, FILM COATED ORAL
Qty: 0 | Refills: 0 | DISCHARGE

## 2021-06-28 RX ORDER — HEPARIN SODIUM 5000 [USP'U]/ML
5000 INJECTION INTRAVENOUS; SUBCUTANEOUS EVERY 8 HOURS
Refills: 0 | Status: DISCONTINUED | OUTPATIENT
Start: 2021-06-28 | End: 2021-06-30

## 2021-06-28 RX ORDER — CARVEDILOL PHOSPHATE 80 MG/1
25 CAPSULE, EXTENDED RELEASE ORAL EVERY 12 HOURS
Refills: 0 | Status: DISCONTINUED | OUTPATIENT
Start: 2021-06-28 | End: 2021-06-30

## 2021-06-28 RX ORDER — GABAPENTIN 400 MG/1
1 CAPSULE ORAL
Qty: 0 | Refills: 0 | DISCHARGE

## 2021-06-28 RX ORDER — LEVOTHYROXINE SODIUM 125 MCG
0 TABLET ORAL
Qty: 0 | Refills: 0 | DISCHARGE

## 2021-06-28 RX ORDER — LEVOTHYROXINE SODIUM 125 MCG
200 TABLET ORAL DAILY
Refills: 0 | Status: DISCONTINUED | OUTPATIENT
Start: 2021-06-28 | End: 2021-06-30

## 2021-06-28 RX ORDER — DIPHENHYDRAMINE HCL 50 MG
1 CAPSULE ORAL
Qty: 0 | Refills: 0 | DISCHARGE

## 2021-06-28 RX ORDER — INSULIN LISPRO 100/ML
8 VIAL (ML) SUBCUTANEOUS
Refills: 0 | Status: DISCONTINUED | OUTPATIENT
Start: 2021-06-28 | End: 2021-06-29

## 2021-06-28 RX ORDER — DEXTROSE 50 % IN WATER 50 %
12.5 SYRINGE (ML) INTRAVENOUS ONCE
Refills: 0 | Status: DISCONTINUED | OUTPATIENT
Start: 2021-06-28 | End: 2021-06-30

## 2021-06-28 RX ORDER — AMLODIPINE BESYLATE 2.5 MG/1
0 TABLET ORAL
Qty: 0 | Refills: 0 | DISCHARGE

## 2021-06-28 RX ORDER — CYCLOSPORINE 0.5 MG/ML
1 EMULSION OPHTHALMIC
Qty: 0 | Refills: 0 | DISCHARGE

## 2021-06-28 RX ORDER — ATORVASTATIN CALCIUM 80 MG/1
40 TABLET, FILM COATED ORAL AT BEDTIME
Refills: 0 | Status: DISCONTINUED | OUTPATIENT
Start: 2021-06-28 | End: 2021-06-30

## 2021-06-28 RX ORDER — FEXOFENADINE HCL 30 MG
1 TABLET ORAL
Qty: 0 | Refills: 0 | DISCHARGE

## 2021-06-28 RX ORDER — HYDROXYZINE HCL 10 MG
0 TABLET ORAL
Qty: 0 | Refills: 0 | DISCHARGE

## 2021-06-28 RX ADMIN — Medication 4: at 17:11

## 2021-06-28 RX ADMIN — AMLODIPINE BESYLATE 10 MILLIGRAM(S): 2.5 TABLET ORAL at 06:13

## 2021-06-28 RX ADMIN — HEPARIN SODIUM 5000 UNIT(S): 5000 INJECTION INTRAVENOUS; SUBCUTANEOUS at 06:14

## 2021-06-28 RX ADMIN — CARVEDILOL PHOSPHATE 25 MILLIGRAM(S): 80 CAPSULE, EXTENDED RELEASE ORAL at 22:29

## 2021-06-28 RX ADMIN — Medication 30 MILLIGRAM(S): at 06:13

## 2021-06-28 RX ADMIN — Medication 81 MILLIGRAM(S): at 12:25

## 2021-06-28 RX ADMIN — Medication 2: at 21:37

## 2021-06-28 RX ADMIN — ATORVASTATIN CALCIUM 40 MILLIGRAM(S): 80 TABLET, FILM COATED ORAL at 21:38

## 2021-06-28 RX ADMIN — Medication 30 MILLIGRAM(S): at 22:28

## 2021-06-28 RX ADMIN — GABAPENTIN 100 MILLIGRAM(S): 400 CAPSULE ORAL at 12:26

## 2021-06-28 RX ADMIN — OCTREOTIDE ACETATE 10 MICROGRAM(S)/HR: 200 INJECTION, SOLUTION INTRAVENOUS; SUBCUTANEOUS at 01:03

## 2021-06-28 RX ADMIN — Medication 50 MILLILITER(S): at 00:30

## 2021-06-28 RX ADMIN — HEPARIN SODIUM 5000 UNIT(S): 5000 INJECTION INTRAVENOUS; SUBCUTANEOUS at 15:35

## 2021-06-28 RX ADMIN — Medication 200 MICROGRAM(S): at 06:14

## 2021-06-28 RX ADMIN — LOSARTAN POTASSIUM 50 MILLIGRAM(S): 100 TABLET, FILM COATED ORAL at 06:13

## 2021-06-28 RX ADMIN — Medication 1 APPLICATION(S): at 22:29

## 2021-06-28 RX ADMIN — HEPARIN SODIUM 5000 UNIT(S): 5000 INJECTION INTRAVENOUS; SUBCUTANEOUS at 21:38

## 2021-06-28 RX ADMIN — Medication 1 TABLET(S): at 12:26

## 2021-06-28 RX ADMIN — SODIUM CHLORIDE 75 MILLILITER(S): 9 INJECTION, SOLUTION INTRAVENOUS at 00:08

## 2021-06-28 NOTE — CHART NOTE - NSCHARTNOTEFT_GEN_A_CORE
Added admelog 8 units as per recommendation given by Dr sparks over the phone.  Susan rockwell NP  [phone]

## 2021-06-28 NOTE — PROGRESS NOTE ADULT - ASSESSMENT
49 yo M with PMH of HTN, HLD, Hypothyroidism, DMII, Renal cell ca s/p right nephrectomy, currently on prednisone, CKD, ?irregular heart beats, presents here after syncopal episode.

## 2021-06-28 NOTE — CONSULT NOTE ADULT - PROBLEM SELECTOR RECOMMENDATION 9
-basal insulin not needed  -add admelog 8 units sq tid-ac  -small correctional scale tid-ac/qhs  DISPO: [email or web address]  F/u with his PCP

## 2021-06-28 NOTE — CONSULT NOTE ADULT - ASSESSMENT
EKG - not in chart     a/p     1) Syncope - likely sec to hypoglycemia sec to glipizide, pt on glipizide as he is on prednisone, has h/o HOCM will get 12 lead EKG and 2d echo     2) Renal cell cancer - f/u oncology     3) CKD - as per pt improving creatnine was > 3

## 2021-06-28 NOTE — CONSULT NOTE ADULT - ASSESSMENT
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 Endocrine consulted for hypoglycemia.

## 2021-06-28 NOTE — CONSULT NOTE ADULT - PROBLEM SELECTOR RECOMMENDATION 4
-atorvastatin 40mg po qhs  -discussed with NP Susan Lundberg MD  Endocrinology Attending  Mondays and Tuesdays 9am-6pm: [phone] (pager)  Other days, night and weekend: [phone]

## 2021-06-28 NOTE — CONSULT NOTE ADULT - SUBJECTIVE AND OBJECTIVE BOX
HPI:  49 yo M with PMH of HTN, HLD, Hypothyroidism, DMII, Renal cell ca s/p right nephrectomy, currently on prednisone, CKD, ?irregular heart beats, presents here after syncopal episode.  Patient was at a [employer] party today when he started feeling sweaty, lightheaded, overall not feeling well.  He then lay down on the couch to rest, next thing he remembers is EMS was there trying to wake up him.  He was told that he passed out.  His finger stick at the time was 51.  In ED, patient had another episode of sweating and lightheadedness.  Repeat blood sugar was 36.  Patient was then given 1L D5 NS, octreotide.  Currently patient denies any complaints.  His FS at this time is 128.  Patient otherwise denies fever, chills, cough, SOB, nausea, vomiting, diarrhea.  He never had syncopal episode in the past.  He denies having chest pain, palpitation, prior to the syncopal episode.   (27 Jun 2021 22:32)      PAST MEDICAL & SURGICAL HISTORY:  HLD (hyperlipidemia)    DM2 (diabetes mellitus, type 2)    Hypothyroidism    Renal cell cancer    S/p nephrectomy    S/P LASIK surgery        FAMILY HISTORY:  FH: lung cancer (Uncle)        Social History:  Tobacco  ETOH  Illicits  Occupation    Outpatient Medications:    MEDICATIONS  (STANDING):  amLODIPine   Tablet 10 milliGRAM(s) Oral daily  aspirin enteric coated 81 milliGRAM(s) Oral daily  atorvastatin 40 milliGRAM(s) Oral at bedtime  dextrose 40% Gel 15 Gram(s) Oral once  dextrose 5%. 1000 milliLiter(s) (50 mL/Hr) IV Continuous <Continuous>  dextrose 5%. 1000 milliLiter(s) (100 mL/Hr) IV Continuous <Continuous>  dextrose 50% Injectable 25 Gram(s) IV Push once  dextrose 50% Injectable 12.5 Gram(s) IV Push once  dextrose 50% Injectable 25 Gram(s) IV Push once  gabapentin 100 milliGRAM(s) Oral daily  glucagon  Injectable 1 milliGRAM(s) IntraMuscular once  heparin   Injectable 5000 Unit(s) SubCutaneous every 8 hours  insulin lispro (ADMELOG) corrective regimen sliding scale   SubCutaneous every 4 hours  levothyroxine 200 MICROGram(s) Oral daily  losartan 50 milliGRAM(s) Oral daily  multivitamin 1 Tablet(s) Oral daily  predniSONE   Tablet 30 milliGRAM(s) Oral daily    MEDICATIONS  (PRN):      Allergies    adhesives (Hives)  lisinopril (Unknown)  shellfish (Unknown)  Zosyn (Hives)    Intolerances      Review of Systems:  Constitutional: No fever, good appetite/po intake  Eyes: No blurry vision, diplopia  Neuro: No tremors  HEENT: No pain  Cardiovascular: No chest pain, palpitations  Respiratory: No SOB, no cough  GI: No nausea, vomiting,   : No dysuria, hematuria  Skin: no rash  Psych: no depression  Endocrine: no polyuria, polydipsia  Hem/lymph: no swelling  Osteoporosis: no fractures    ALL OTHER SYSTEMS REVIEWED AND NEGATIVE    UNABLE TO OBTAIN    PHYSICAL EXAM:  VITALS: T(C): 36.8 (06-28-21 @ 11:56)  T(F): 98.3 (06-28-21 @ 11:56), Max: 99.2 (06-28-21 @ 09:55)  HR: 66 (06-28-21 @ 11:56) (58 - 72)  BP: 158/89 (06-28-21 @ 11:56) (149/81 - 198/83)  RR:  (16 - 18)  SpO2:  (98% - 100%)  Wt(kg): --  GENERAL: NAD, well-groomed, well-developed  EYES: No proptosis, no lid lag, anicteric  HEENT:  Atraumatic, Normocephalic, moist mucous membranes  THYROID: Normal size, no palpable nodules  RESPIRATORY: Clear to auscultation bilaterally; No rales, rhonchi, wheezing, or rubs  CARDIOVASCULAR: Regular rate and rhythm; No murmurs; no peripheral edema  GI: Soft, nontender, non distended, normal bowel sounds  SKIN: Dry, intact, No rashes or lesions  NEURO: sensation intact, extraocular movements intact, no tremor, normal reflexes  PSYCH: reactive affect, euthymic mood  CUSHING'S SIGNS: no striae    POCT Blood Glucose.: 277 mg/dL (06-28-21 @ 12:19)  POCT Blood Glucose.: 155 mg/dL (06-28-21 @ 07:59)  POCT Blood Glucose.: 204 mg/dL (06-28-21 @ 03:16)  POCT Blood Glucose.: 207 mg/dL (06-28-21 @ 00:52)  POCT Blood Glucose.: 70 mg/dL (06-28-21 @ 00:10)  POCT Blood Glucose.: 128 mg/dL (06-27-21 @ 22:57)  POCT Blood Glucose.: 210 mg/dL (06-27-21 @ 20:52)  POCT Blood Glucose.: 36 mg/dL (06-27-21 @ 20:31)  POCT Blood Glucose.: 136 mg/dL (06-27-21 @ 18:41)  POCT Blood Glucose.: 185 mg/dL (06-27-21 @ 18:27)                            11.6   9.41  )-----------( 222      ( 28 Jun 2021 06:59 )             34.0       06-28    137  |  106  |  62<H>  ----------------------------<  171<H>  3.6   |  18<L>  |  2.50<H>    EGFR if : 34<L>  EGFR if non : 29<L>    Ca    7.9<L>      06-28  Mg     2.1     06-28  Phos  4.4     06-28    TPro  4.8<L>  /  Alb  2.8<L>  /  TBili  0.5  /  DBili  x   /  AST  16  /  ALT  27  /  AlkPhos  38<L>  06-28      Thyroid Function Tests:              Radiology:     A/P: yo male/female with hx of ................................... here with ..........         HPI: 49 yo male with hx of T2D x 12 years uncontrolled (HbA1c pending) with CKD3 and neuropathy, renal cell ca s/p nephrectomy 9/19,  HTN, HLD, hypothyroidism due to immunotherapy. Renal cell ca s/p right nephrectomy who was BIBEMS for an episode hypoglycemia associated with l.o.c. The patient lives in [place] but comes up to [place] for his cancer care. On Sunday morning he ate yogurt with berries at 6am and took his glipizide. He went to a family gathering that afternoon. On the way he noted that he was sweating. About 130pm he had vegetables and a small portion of rice. While there he was sweating as well. He suddenly became disoriented and his family told him to lay down. The next thing he knew he was in an ambulance.   He has been prednisone 50mg po qdaily for the past few months due to worsening renal function from his immunotherapy. He has tapered it down by 10mg over time. His BS are usually 120-150. He walks 3 miles after he eats.  His immunotherapy has also led to hypothyroidism. Per his chart oscar, his TSH was 0.02 on 6/14 but his Synthroid dose was not decreased.      PAST MEDICAL & SURGICAL HISTORY:  HLD (hyperlipidemia)  DM2 (diabetes mellitus, type 2)  Hypothyroidism  Renal cell cancer S/p nephrectomy  S/P LASIK surgery      FAMILY HISTORY:  Diabetes: Mat GM, mother      Social History:  Tobacco: smoked socially in HS/college  ETOH: last drink was 2 years ago  Occupation: contractor    Outpatient Medications: glipizide 10mg po bid    MEDICATIONS  (STANDING):  amLODIPine   Tablet 10 milliGRAM(s) Oral daily  aspirin enteric coated 81 milliGRAM(s) Oral daily  atorvastatin 40 milliGRAM(s) Oral at bedtime  dextrose 40% Gel 15 Gram(s) Oral once  dextrose 5%. 1000 milliLiter(s) (50 mL/Hr) IV Continuous <Continuous>  dextrose 5%. 1000 milliLiter(s) (100 mL/Hr) IV Continuous <Continuous>  dextrose 50% Injectable 25 Gram(s) IV Push once  dextrose 50% Injectable 12.5 Gram(s) IV Push once  dextrose 50% Injectable 25 Gram(s) IV Push once  gabapentin 100 milliGRAM(s) Oral daily  glucagon  Injectable 1 milliGRAM(s) IntraMuscular once  heparin   Injectable 5000 Unit(s) SubCutaneous every 8 hours  insulin lispro (ADMELOG) corrective regimen sliding scale   SubCutaneous every 4 hours  levothyroxine 200 MICROGram(s) Oral daily  losartan 50 milliGRAM(s) Oral daily  multivitamin 1 Tablet(s) Oral daily  predniSONE   Tablet 30 milliGRAM(s) Oral daily    MEDICATIONS  (PRN):      Allergies  adhesives (Hives)  lisinopril (Unknown)  shellfish (Unknown)  Zosyn (Hives)    Review of Systems:  Constitutional: no fever, +chills  Eyes: No blurry vision, diplopia  Neuro: No HA, +neuropathy of feet  Cardiovascular: No chest pain, palpitations  Respiratory: No SOB, no cough  GI: +nausea, vomiting,   : No dysuria, hematuria  Endocrine: +hypoglycemia, sweats  ALL OTHER SYSTEMS REVIEWED AND NEGATIVE      PHYSICAL EXAM:  VITALS: T(C): 36.8 (06-28-21 @ 11:56)  T(F): 98.3 (06-28-21 @ 11:56), Max: 99.2 (06-28-21 @ 09:55)  HR: 66 (06-28-21 @ 11:56) (58 - 72)  BP: 158/89 (06-28-21 @ 11:56) (149/81 - 198/83)  RR:  (16 - 18)  SpO2:  (98% - 100%)  Wt(kg): --  GENERAL: NAD, well-groomed, well-developed  EYES: No proptosis, anicteric  HEENT:  Atraumatic, Normocephalic, moist mucous membranes  THYROID: About 20g with smooth texture  RESPIRATORY: Clear to auscultation bilaterally; No rales, rhonchi, wheezing, or rubs  CARDIOVASCULAR: Regular rate and rhythm; No murmurs; no peripheral edema  GI: Soft, nontender, non distended, normal bowel sounds  SKIN: Dry, intact, No rashes or lesions on feet b/l  PSYCH: reactive affect, euthymic mood      POCT Blood Glucose.: 277 mg/dL (06-28-21 @ 12:19)  POCT Blood Glucose.: 155 mg/dL (06-28-21 @ 07:59)  POCT Blood Glucose.: 204 mg/dL (06-28-21 @ 03:16)  POCT Blood Glucose.: 207 mg/dL (06-28-21 @ 00:52)  POCT Blood Glucose.: 70 mg/dL (06-28-21 @ 00:10)  POCT Blood Glucose.: 128 mg/dL (06-27-21 @ 22:57)  POCT Blood Glucose.: 210 mg/dL (06-27-21 @ 20:52)  POCT Blood Glucose.: 36 mg/dL (06-27-21 @ 20:31)  POCT Blood Glucose.: 136 mg/dL (06-27-21 @ 18:41)  POCT Blood Glucose.: 185 mg/dL (06-27-21 @ 18:27)                            11.6   9.41  )-----------( 222      ( 28 Jun 2021 06:59 )             34.0       06-28    137  |  106  |  62<H>  ----------------------------<  171<H>  3.6   |  18<L>  |  2.50<H>    EGFR if : 34<L>  EGFR if non : 29<L>    Ca    7.9<L>      06-28  Mg     2.1     06-28  Phos  4.4     06-28    TPro  4.8<L>  /  Alb  2.8<L>  /  TBili  0.5  /  DBili  x   /  AST  16  /  ALT  27  /  AlkPhos  38<L>  06-28

## 2021-06-28 NOTE — PROGRESS NOTE ADULT - SUBJECTIVE AND OBJECTIVE BOX
SUBJECTIVE / OVERNIGHT EVENTS: pt seen and examined      MEDICATIONS  (STANDING):  amLODIPine   Tablet 10 milliGRAM(s) Oral daily  aspirin enteric coated 81 milliGRAM(s) Oral daily  atorvastatin 40 milliGRAM(s) Oral at bedtime  BACItracin   Ointment 1 Application(s) Topical two times a day  carvedilol 25 milliGRAM(s) Oral every 12 hours  dextrose 40% Gel 15 Gram(s) Oral once  dextrose 5%. 1000 milliLiter(s) (50 mL/Hr) IV Continuous <Continuous>  dextrose 5%. 1000 milliLiter(s) (100 mL/Hr) IV Continuous <Continuous>  dextrose 50% Injectable 25 Gram(s) IV Push once  dextrose 50% Injectable 12.5 Gram(s) IV Push once  dextrose 50% Injectable 25 Gram(s) IV Push once  gabapentin 100 milliGRAM(s) Oral daily  glucagon  Injectable 1 milliGRAM(s) IntraMuscular once  heparin   Injectable 5000 Unit(s) SubCutaneous every 8 hours  insulin lispro (ADMELOG) corrective regimen sliding scale   SubCutaneous every 4 hours  insulin lispro Injectable (ADMELOG) 8 Unit(s) SubCutaneous three times a day before meals  levothyroxine 200 MICROGram(s) Oral daily  losartan 50 milliGRAM(s) Oral daily  multivitamin 1 Tablet(s) Oral daily  predniSONE   Tablet 30 milliGRAM(s) Oral two times a day    MEDICATIONS  (PRN):  Vital Signs Last 24 Hrs  T(C): 37 (28 Jun 2021 23:20), Max: 37.3 (28 Jun 2021 09:55)  T(F): 98.6 (28 Jun 2021 23:20), Max: 99.2 (28 Jun 2021 09:55)  HR: 55 (28 Jun 2021 23:20) (55 - 68)  BP: 153/82 (28 Jun 2021 23:20) (149/81 - 188/105)  BP(mean): --  RR: 18 (28 Jun 2021 23:20) (18 - 18)  SpO2: 98% (28 Jun 2021 23:20) (98% - 99%)      CAPILLARY BLOOD GLUCOSE      POCT Blood Glucose.: 225 mg/dL (28 Jun 2021 20:56)  POCT Blood Glucose.: 304 mg/dL (28 Jun 2021 17:01)  POCT Blood Glucose.: 277 mg/dL (28 Jun 2021 12:19)  POCT Blood Glucose.: 155 mg/dL (28 Jun 2021 07:59)  POCT Blood Glucose.: 204 mg/dL (28 Jun 2021 03:16)  POCT Blood Glucose.: 207 mg/dL (28 Jun 2021 00:52)    I&O's Summary      Constitutional: No fever, fatigue  Skin: No rash.  Eyes: No recent vision problems or eye pain.  ENT: No congestion, ear pain, or sore throat.  Cardiovascular: No chest pain or palpation.  Respiratory: No cough, shortness of breath, congestion, or wheezing.  Gastrointestinal: No abdominal pain, nausea, vomiting, or diarrhea.  Genitourinary: No dysuria.  Musculoskeletal: No joint swelling.  Neurologic: No headache.    PHYSICAL EXAM:  GENERAL: NAD  EYES: EOMI, PERRLA  NECK: Supple, No JVD  CHEST/LUNG: cta chris  HEART:  S1 , S2 +  ABDOMEN: soft , bs+  EXTREMITIES:  no edema  NEUROLOGY:alert awake      LABS:                        11.6   9.41  )-----------( 222      ( 28 Jun 2021 06:59 )             34.0     06-28    137  |  106  |  62<H>  ----------------------------<  171<H>  3.6   |  18<L>  |  2.50<H>    Ca    7.9<L>      28 Jun 2021 06:59  Phos  4.4     06-28  Mg     2.1     06-28    TPro  4.8<L>  /  Alb  2.8<L>  /  TBili  0.5  /  DBili  x   /  AST  16  /  ALT  27  /  AlkPhos  38<L>  06-28              RADIOLOGY & ADDITIONAL TESTS:    Imaging Personally Reviewed:    Consultant(s) Notes Reviewed:      Care Discussed with Consultants/Other Providers:

## 2021-06-28 NOTE — CHART NOTE - NSCHARTNOTEFT_GEN_A_CORE
Recommended by cardiology (Dr Grande) to order Tele and Echo for this patient AW Syncope  and the ordere placed in  Susan Membreno NP  [phone]

## 2021-06-28 NOTE — CONSULT NOTE ADULT - SUBJECTIVE AND OBJECTIVE BOX
Frandy Grande MD  Interventional Cardiology / Advance Heart Failure and Cardiac Transplant Specialist  [place] Office : [phone] [place] [address] [place] [address]  Tel:   [place] Office : [phone] [facility] [place] [address]  Tel: [phone]  Cell : [phone]      HISTORY OF PRESENTING ILLNESS:  HPI:  51 yo M with PMH of HTN, HLD, Hypothyroidism, DMII, Renal cell ca s/p right nephrectomy, currently on prednisone, CKD, ?irregular heart beats, presents here after syncopal episode.  Patient was at a [facility] party today when he started feeling sweaty, lightheaded, overall not feeling well.  He then lay down on the couch to rest, next thing he remembers is EMS was there trying to wake up him.  He was told that he passed out.  His finger stick at the time was 51.  In ED, patient had another episode of sweating and lightheadedness.  Repeat blood sugar was 36.  Patient was then given 1L D5 NS, octreotide.  Currently patient denies any complaints.  His FS at this time is 128.  Patient otherwise denies fever, chills, cough, SOB, nausea, vomiting, diarrhea.  He never had syncopal episode in the past.  He denies having chest pain, palpitation, prior to the syncopal episode.   Pt has h/o hypertrophic cardiomyopathy , as per pt no h/o syncope     PAST MEDICAL & SURGICAL HISTORY:  HLD (hyperlipidemia)    DM2 (diabetes mellitus, type 2)    Hypothyroidism    Renal cell cancer    S/p nephrectomy    S/P LASIK surgery        SOCIAL HISTORY: Substance Use (street drugs): ( x ) never used  (  ) other:    FAMILY HISTORY:  FH: lung cancer (Uncle)         MEDICATIONS:  amLODIPine   Tablet 10 milliGRAM(s) Oral daily  aspirin enteric coated 81 milliGRAM(s) Oral daily  heparin   Injectable 5000 Unit(s) SubCutaneous every 8 hours  losartan 50 milliGRAM(s) Oral daily  gabapentin 100 milliGRAM(s) Oral daily  atorvastatin 40 milliGRAM(s) Oral at bedtime  dextrose 40% Gel 15 Gram(s) Oral once  dextrose 50% Injectable 25 Gram(s) IV Push once  dextrose 50% Injectable 12.5 Gram(s) IV Push once  dextrose 50% Injectable 25 Gram(s) IV Push once  glucagon  Injectable 1 milliGRAM(s) IntraMuscular once  insulin lispro (ADMELOG) corrective regimen sliding scale   SubCutaneous every 4 hours  levothyroxine 200 MICROGram(s) Oral daily  predniSONE   Tablet 30 milliGRAM(s) Oral daily    dextrose 5%. 1000 milliLiter(s) IV Continuous <Continuous>  dextrose 5%. 1000 milliLiter(s) IV Continuous <Continuous>  multivitamin 1 Tablet(s) Oral daily      FAMILY HISTORY:  FH: lung cancer (Uncle)          Allergies    adhesives (Hives)  lisinopril (Unknown)  shellfish (Unknown)  Zosyn (Hives)    Intolerances    	      PHYSICAL EXAM:  T(C): 36.8 (06-28-21 @ 11:56), Max: 37.3 (06-28-21 @ 09:55)  HR: 66 (06-28-21 @ 11:56) (58 - 72)  BP: 158/89 (06-28-21 @ 11:56) (149/81 - 198/83)  RR: 18 (06-28-21 @ 11:56) (16 - 18)  SpO2: 98% (06-28-21 @ 11:56) (98% - 100%)  Wt(kg): --  I&O's Summary         EYES: EOMI, PERRLA, conjunctiva and sclera clear  ENMT: No tonsillar erythema, exudates, or enlargement; Moist mucous membranes, Good dentition, No lesions  Cardiovascular: Normal S1 S2, No JVD, No murmurs, No edema  Respiratory: Lungs clear to auscultation	  Gastrointestinal:  Soft, Non-tender, + BS	  Extremities: no edema      LABS:	 	    CARDIAC MARKERS:                                  11.6   9.41  )-----------( 222      ( 28 Jun 2021 06:59 )             34.0     06-28    137  |  106  |  62<H>  ----------------------------<  171<H>  3.6   |  18<L>  |  2.50<H>    Ca    7.9<L>      28 Jun 2021 06:59  Phos  4.4     06-28  Mg     2.1     06-28    TPro  4.8<L>  /  Alb  2.8<L>  /  TBili  0.5  /  DBili  x   /  AST  16  /  ALT  27  /  AlkPhos  38<L>  06-28    proBNP:   Lipid Profile:   HgA1c:   TSH:     Consultant(s) Notes Reviewed:  [x ] YES  [ ] NO    Care Discussed with Consultants/Other Providers [ x] YES  [ ] NO    Imaging Personally Reviewed independently:  [x] YES  [ ] NO    All labs, radiologic studies, vitals, orders and medications list reviewed. Patient is seen and examined at bedside. Case discussed with medical team.    ASSESSMENT/PLAN:

## 2021-06-29 DIAGNOSIS — E03.9 HYPOTHYROIDISM, UNSPECIFIED: ICD-10-CM

## 2021-06-29 DIAGNOSIS — I10 ESSENTIAL (PRIMARY) HYPERTENSION: ICD-10-CM

## 2021-06-29 DIAGNOSIS — E78.2 MIXED HYPERLIPIDEMIA: ICD-10-CM

## 2021-06-29 DIAGNOSIS — E11.649 TYPE 2 DIABETES MELLITUS WITH HYPOGLYCEMIA WITHOUT COMA: ICD-10-CM

## 2021-06-29 LAB
A1C WITH ESTIMATED AVERAGE GLUCOSE RESULT: 6.2 % — HIGH (ref 4–5.6)
ALBUMIN SERPL ELPH-MCNC: 2.9 G/DL — LOW (ref 3.3–5)
ALP SERPL-CCNC: 43 U/L — SIGNIFICANT CHANGE UP (ref 40–120)
ALT FLD-CCNC: 23 U/L — SIGNIFICANT CHANGE UP (ref 10–45)
ANION GAP SERPL CALC-SCNC: 13 MMOL/L — SIGNIFICANT CHANGE UP (ref 5–17)
APPEARANCE UR: CLEAR — SIGNIFICANT CHANGE UP
AST SERPL-CCNC: 15 U/L — SIGNIFICANT CHANGE UP (ref 10–40)
BACTERIA # UR AUTO: NEGATIVE — SIGNIFICANT CHANGE UP
BILIRUB SERPL-MCNC: 0.4 MG/DL — SIGNIFICANT CHANGE UP (ref 0.2–1.2)
BILIRUB UR-MCNC: NEGATIVE — SIGNIFICANT CHANGE UP
BUN SERPL-MCNC: 65 MG/DL — HIGH (ref 7–23)
CALCIUM SERPL-MCNC: 8.1 MG/DL — LOW (ref 8.4–10.5)
CHLORIDE SERPL-SCNC: 105 MMOL/L — SIGNIFICANT CHANGE UP (ref 96–108)
CO2 SERPL-SCNC: 19 MMOL/L — LOW (ref 22–31)
COLOR SPEC: SIGNIFICANT CHANGE UP
COVID-19 SPIKE DOMAIN AB INTERP: POSITIVE
COVID-19 SPIKE DOMAIN ANTIBODY RESULT: 116 U/ML — HIGH
CREAT ?TM UR-MCNC: 98 MG/DL — SIGNIFICANT CHANGE UP
CREAT SERPL-MCNC: 2.81 MG/DL — HIGH (ref 0.5–1.3)
DIFF PNL FLD: ABNORMAL
EPI CELLS # UR: 0 /HPF — SIGNIFICANT CHANGE UP
ESTIMATED AVERAGE GLUCOSE: 131 MG/DL — HIGH (ref 68–114)
GLUCOSE BLDC GLUCOMTR-MCNC: 189 MG/DL — HIGH (ref 70–99)
GLUCOSE BLDC GLUCOMTR-MCNC: 190 MG/DL — HIGH (ref 70–99)
GLUCOSE BLDC GLUCOMTR-MCNC: 210 MG/DL — HIGH (ref 70–99)
GLUCOSE BLDC GLUCOMTR-MCNC: 225 MG/DL — HIGH (ref 70–99)
GLUCOSE BLDC GLUCOMTR-MCNC: 237 MG/DL — HIGH (ref 70–99)
GLUCOSE BLDC GLUCOMTR-MCNC: 250 MG/DL — HIGH (ref 70–99)
GLUCOSE SERPL-MCNC: 205 MG/DL — HIGH (ref 70–99)
GLUCOSE UR QL: ABNORMAL
HCT VFR BLD CALC: 35.5 % — LOW (ref 39–50)
HGB BLD-MCNC: 11.7 G/DL — LOW (ref 13–17)
HYALINE CASTS # UR AUTO: 1 /LPF — SIGNIFICANT CHANGE UP (ref 0–2)
KETONES UR-MCNC: NEGATIVE — SIGNIFICANT CHANGE UP
LEUKOCYTE ESTERASE UR-ACNC: NEGATIVE — SIGNIFICANT CHANGE UP
MCHC RBC-ENTMCNC: 28.8 PG — SIGNIFICANT CHANGE UP (ref 27–34)
MCHC RBC-ENTMCNC: 33 GM/DL — SIGNIFICANT CHANGE UP (ref 32–36)
MCV RBC AUTO: 87.4 FL — SIGNIFICANT CHANGE UP (ref 80–100)
NITRITE UR-MCNC: NEGATIVE — SIGNIFICANT CHANGE UP
NRBC # BLD: 0 /100 WBCS — SIGNIFICANT CHANGE UP (ref 0–0)
PH UR: 6 — SIGNIFICANT CHANGE UP (ref 5–8)
PLATELET # BLD AUTO: 229 K/UL — SIGNIFICANT CHANGE UP (ref 150–400)
POTASSIUM SERPL-MCNC: 3.8 MMOL/L — SIGNIFICANT CHANGE UP (ref 3.5–5.3)
POTASSIUM SERPL-SCNC: 3.8 MMOL/L — SIGNIFICANT CHANGE UP (ref 3.5–5.3)
PROT ?TM UR-MCNC: 160 MG/DL — HIGH (ref 0–12)
PROT SERPL-MCNC: 4.9 G/DL — LOW (ref 6–8.3)
PROT UR-MCNC: ABNORMAL
PROT/CREAT UR-RTO: 1.6 RATIO — HIGH (ref 0–0.2)
RBC # BLD: 4.06 M/UL — LOW (ref 4.2–5.8)
RBC # FLD: 12.7 % — SIGNIFICANT CHANGE UP (ref 10.3–14.5)
RBC CASTS # UR COMP ASSIST: 1 /HPF — SIGNIFICANT CHANGE UP (ref 0–4)
SARS-COV-2 IGG+IGM SERPL QL IA: 116 U/ML — HIGH
SARS-COV-2 IGG+IGM SERPL QL IA: POSITIVE
SODIUM SERPL-SCNC: 137 MMOL/L — SIGNIFICANT CHANGE UP (ref 135–145)
SP GR SPEC: 1.01 — SIGNIFICANT CHANGE UP (ref 1.01–1.02)
T4 FREE SERPL-MCNC: 1.4 NG/DL — SIGNIFICANT CHANGE UP (ref 0.9–1.8)
TSH SERPL-MCNC: 0.02 UIU/ML — LOW (ref 0.27–4.2)
UROBILINOGEN FLD QL: NEGATIVE — SIGNIFICANT CHANGE UP
WBC # BLD: 8.98 K/UL — SIGNIFICANT CHANGE UP (ref 3.8–10.5)
WBC # FLD AUTO: 8.98 K/UL — SIGNIFICANT CHANGE UP (ref 3.8–10.5)
WBC UR QL: 1 /HPF — SIGNIFICANT CHANGE UP (ref 0–5)

## 2021-06-29 PROCEDURE — 99232 SBSQ HOSP IP/OBS MODERATE 35: CPT

## 2021-06-29 PROCEDURE — 93306 TTE W/DOPPLER COMPLETE: CPT | Mod: 26

## 2021-06-29 RX ORDER — INSULIN GLARGINE 100 [IU]/ML
16 INJECTION, SOLUTION SUBCUTANEOUS AT BEDTIME
Refills: 0 | Status: DISCONTINUED | OUTPATIENT
Start: 2021-06-29 | End: 2021-06-30

## 2021-06-29 RX ORDER — INSULIN LISPRO 100/ML
VIAL (ML) SUBCUTANEOUS AT BEDTIME
Refills: 0 | Status: DISCONTINUED | OUTPATIENT
Start: 2021-06-29 | End: 2021-06-30

## 2021-06-29 RX ORDER — CHLORHEXIDINE GLUCONATE 213 G/1000ML
1 SOLUTION TOPICAL DAILY
Refills: 0 | Status: DISCONTINUED | OUTPATIENT
Start: 2021-06-29 | End: 2021-06-30

## 2021-06-29 RX ORDER — INSULIN LISPRO 100/ML
10 VIAL (ML) SUBCUTANEOUS
Refills: 0 | Status: DISCONTINUED | OUTPATIENT
Start: 2021-06-29 | End: 2021-06-30

## 2021-06-29 RX ORDER — INSULIN LISPRO 100/ML
VIAL (ML) SUBCUTANEOUS
Refills: 0 | Status: DISCONTINUED | OUTPATIENT
Start: 2021-06-29 | End: 2021-06-30

## 2021-06-29 RX ADMIN — HEPARIN SODIUM 5000 UNIT(S): 5000 INJECTION INTRAVENOUS; SUBCUTANEOUS at 05:24

## 2021-06-29 RX ADMIN — Medication 30 MILLIGRAM(S): at 09:26

## 2021-06-29 RX ADMIN — Medication 2: at 12:14

## 2021-06-29 RX ADMIN — Medication 1: at 17:22

## 2021-06-29 RX ADMIN — AMLODIPINE BESYLATE 10 MILLIGRAM(S): 2.5 TABLET ORAL at 05:24

## 2021-06-29 RX ADMIN — Medication 200 MICROGRAM(S): at 05:24

## 2021-06-29 RX ADMIN — Medication 8 UNIT(S): at 12:13

## 2021-06-29 RX ADMIN — HEPARIN SODIUM 5000 UNIT(S): 5000 INJECTION INTRAVENOUS; SUBCUTANEOUS at 13:57

## 2021-06-29 RX ADMIN — Medication 1 TABLET(S): at 12:13

## 2021-06-29 RX ADMIN — Medication 81 MILLIGRAM(S): at 12:13

## 2021-06-29 RX ADMIN — Medication 8 UNIT(S): at 17:21

## 2021-06-29 RX ADMIN — Medication 30 MILLIGRAM(S): at 17:20

## 2021-06-29 RX ADMIN — INSULIN GLARGINE 16 UNIT(S): 100 INJECTION, SOLUTION SUBCUTANEOUS at 21:43

## 2021-06-29 RX ADMIN — Medication 1 APPLICATION(S): at 05:24

## 2021-06-29 RX ADMIN — GABAPENTIN 100 MILLIGRAM(S): 400 CAPSULE ORAL at 12:13

## 2021-06-29 RX ADMIN — CARVEDILOL PHOSPHATE 25 MILLIGRAM(S): 80 CAPSULE, EXTENDED RELEASE ORAL at 17:20

## 2021-06-29 RX ADMIN — Medication 8 UNIT(S): at 08:49

## 2021-06-29 RX ADMIN — HEPARIN SODIUM 5000 UNIT(S): 5000 INJECTION INTRAVENOUS; SUBCUTANEOUS at 21:44

## 2021-06-29 RX ADMIN — Medication 2: at 02:38

## 2021-06-29 RX ADMIN — Medication 1 APPLICATION(S): at 17:20

## 2021-06-29 RX ADMIN — CARVEDILOL PHOSPHATE 25 MILLIGRAM(S): 80 CAPSULE, EXTENDED RELEASE ORAL at 05:26

## 2021-06-29 RX ADMIN — ATORVASTATIN CALCIUM 40 MILLIGRAM(S): 80 TABLET, FILM COATED ORAL at 21:43

## 2021-06-29 NOTE — PROGRESS NOTE ADULT - PROBLEM SELECTOR PLAN 4
c/w synthroid
-atorvastatin 40mg po qhs  -discussed with NP Susan Lundberg MD  Endocrinology Attending  Mondays and Tuesdays 9am-6pm: [phone] (pager)  Other days, night and weekend: [phone].
c/w synthroid  f/u tfts

## 2021-06-29 NOTE — PROGRESS NOTE ADULT - SUBJECTIVE AND OBJECTIVE BOX
Chief Complaint/Follow-up on:     Subjective:    MEDICATIONS  (STANDING):  amLODIPine   Tablet 10 milliGRAM(s) Oral daily  aspirin enteric coated 81 milliGRAM(s) Oral daily  atorvastatin 40 milliGRAM(s) Oral at bedtime  BACItracin   Ointment 1 Application(s) Topical two times a day  carvedilol 25 milliGRAM(s) Oral every 12 hours  dextrose 40% Gel 15 Gram(s) Oral once  dextrose 5%. 1000 milliLiter(s) (50 mL/Hr) IV Continuous <Continuous>  dextrose 5%. 1000 milliLiter(s) (100 mL/Hr) IV Continuous <Continuous>  dextrose 50% Injectable 25 Gram(s) IV Push once  dextrose 50% Injectable 12.5 Gram(s) IV Push once  dextrose 50% Injectable 25 Gram(s) IV Push once  gabapentin 100 milliGRAM(s) Oral daily  glucagon  Injectable 1 milliGRAM(s) IntraMuscular once  heparin   Injectable 5000 Unit(s) SubCutaneous every 8 hours  insulin lispro (ADMELOG) corrective regimen sliding scale   SubCutaneous three times a day before meals  insulin lispro (ADMELOG) corrective regimen sliding scale   SubCutaneous at bedtime  insulin lispro Injectable (ADMELOG) 8 Unit(s) SubCutaneous three times a day before meals  levothyroxine 200 MICROGram(s) Oral daily  multivitamin 1 Tablet(s) Oral daily  predniSONE   Tablet 30 milliGRAM(s) Oral two times a day    MEDICATIONS  (PRN):      PHYSICAL EXAM:  VITALS: T(C): 37.2 (06-29-21 @ 09:20)  T(F): 99 (06-29-21 @ 09:20), Max: 99 (06-29-21 @ 09:20)  HR: 66 (06-29-21 @ 09:20) (55 - 68)  BP: 143/79 (06-29-21 @ 09:20) (143/79 - 188/105)  RR:  (18 - 18)  SpO2:  (98% - 98%)  Wt(kg): --  GENERAL: NAD, well-groomed, well-developed  EYES: No proptosis, no injection  HEENT:  Atraumatic, Normocephalic, moist mucous membranes  THYROID: Normal size, no palpable nodules  RESPIRATORY: Clear to auscultation bilaterally; No rales, rhonchi, wheezing, or rubs  CARDIOVASCULAR: Regular rate and rhythm; No murmurs; no peripheral edema  GI: Soft, nontender, non distended, normal bowel sounds  CUSHING'S SIGNS: no striae    POCT Blood Glucose.: 250 mg/dL (06-29-21 @ 11:55)  POCT Blood Glucose.: 225 mg/dL (06-29-21 @ 08:44)  POCT Blood Glucose.: 189 mg/dL (06-29-21 @ 06:10)  POCT Blood Glucose.: 210 mg/dL (06-29-21 @ 02:12)  POCT Blood Glucose.: 225 mg/dL (06-28-21 @ 20:56)  POCT Blood Glucose.: 304 mg/dL (06-28-21 @ 17:01)  POCT Blood Glucose.: 277 mg/dL (06-28-21 @ 12:19)  POCT Blood Glucose.: 155 mg/dL (06-28-21 @ 07:59)  POCT Blood Glucose.: 204 mg/dL (06-28-21 @ 03:16)  POCT Blood Glucose.: 207 mg/dL (06-28-21 @ 00:52)  POCT Blood Glucose.: 70 mg/dL (06-28-21 @ 00:10)  POCT Blood Glucose.: 128 mg/dL (06-27-21 @ 22:57)  POCT Blood Glucose.: 210 mg/dL (06-27-21 @ 20:52)  POCT Blood Glucose.: 36 mg/dL (06-27-21 @ 20:31)  POCT Blood Glucose.: 136 mg/dL (06-27-21 @ 18:41)  POCT Blood Glucose.: 185 mg/dL (06-27-21 @ 18:27)    06-29    137  |  105  |  65<H>  ----------------------------<  205<H>  3.8   |  19<L>  |  2.81<H>    EGFR if : 29<L>  EGFR if non : 25<L>    Ca    8.1<L>      06-29  Mg     2.1     06-28  Phos  4.4     06-28    TPro  4.9<L>  /  Alb  2.9<L>  /  TBili  0.4  /  DBili  x   /  AST  15  /  ALT  23  /  AlkPhos  43  06-29          Thyroid Function Tests:  06-29 @ 10:23 TSH 0.02 FreeT4 1.4 T3 -- Anti TPO -- Anti Thyroglobulin Ab -- TSI --                       Chief Complaint/Follow-up on: T2D    Subjective: Patient inquired about a service dog for his diabetes. We discussed that they are hard to get. Also he is interested in getting a [employer]. Per patient he has always been told that he has pre-DM not T2D.  He is eating well. His HbA1c is 6.2% so he is on a renal/cardiac diet. He is concerned about the amount of CHO.     MEDICATIONS  (STANDING):  amLODIPine   Tablet 10 milliGRAM(s) Oral daily  aspirin enteric coated 81 milliGRAM(s) Oral daily  atorvastatin 40 milliGRAM(s) Oral at bedtime  BACItracin   Ointment 1 Application(s) Topical two times a day  carvedilol 25 milliGRAM(s) Oral every 12 hours  dextrose 40% Gel 15 Gram(s) Oral once  dextrose 5%. 1000 milliLiter(s) (50 mL/Hr) IV Continuous <Continuous>  dextrose 5%. 1000 milliLiter(s) (100 mL/Hr) IV Continuous <Continuous>  dextrose 50% Injectable 25 Gram(s) IV Push once  dextrose 50% Injectable 12.5 Gram(s) IV Push once  dextrose 50% Injectable 25 Gram(s) IV Push once  gabapentin 100 milliGRAM(s) Oral daily  glucagon  Injectable 1 milliGRAM(s) IntraMuscular once  heparin   Injectable 5000 Unit(s) SubCutaneous every 8 hours  insulin lispro (ADMELOG) corrective regimen sliding scale   SubCutaneous three times a day before meals  insulin lispro (ADMELOG) corrective regimen sliding scale   SubCutaneous at bedtime  insulin lispro Injectable (ADMELOG) 8 Unit(s) SubCutaneous three times a day before meals  levothyroxine 200 MICROGram(s) Oral daily  multivitamin 1 Tablet(s) Oral daily  predniSONE   Tablet 30 milliGRAM(s) Oral two times a day    MEDICATIONS  (PRN):      PHYSICAL EXAM:  VITALS: T(C): 37.2 (06-29-21 @ 09:20)  T(F): 99 (06-29-21 @ 09:20), Max: 99 (06-29-21 @ 09:20)  HR: 66 (06-29-21 @ 09:20) (55 - 68)  BP: 143/79 (06-29-21 @ 09:20) (143/79 - 188/105)  RR:  (18 - 18)  SpO2:  (98% - 98%)  Wt(kg): --  GENERAL: NAD, well-groomed, well-developed  EYES: No proptosis, no injection  HEENT:  Atraumatic, Normocephalic, moist mucous membranes  RESPIRATORY: Clear to auscultation bilaterally; No rales, rhonchi, wheezing, or rubs  CARDIOVASCULAR: Regular rate and rhythm; No murmurs; no peripheral edema  GI: Soft, nontender, non distended, normal bowel sounds      POCT Blood Glucose.: 250 mg/dL (06-29-21 @ 11:55)  POCT Blood Glucose.: 225 mg/dL (06-29-21 @ 08:44)  POCT Blood Glucose.: 189 mg/dL (06-29-21 @ 06:10)  POCT Blood Glucose.: 210 mg/dL (06-29-21 @ 02:12)  POCT Blood Glucose.: 225 mg/dL (06-28-21 @ 20:56)  POCT Blood Glucose.: 304 mg/dL (06-28-21 @ 17:01)  POCT Blood Glucose.: 277 mg/dL (06-28-21 @ 12:19)  POCT Blood Glucose.: 155 mg/dL (06-28-21 @ 07:59)  POCT Blood Glucose.: 204 mg/dL (06-28-21 @ 03:16)  POCT Blood Glucose.: 207 mg/dL (06-28-21 @ 00:52)  POCT Blood Glucose.: 70 mg/dL (06-28-21 @ 00:10)  POCT Blood Glucose.: 128 mg/dL (06-27-21 @ 22:57)  POCT Blood Glucose.: 210 mg/dL (06-27-21 @ 20:52)  POCT Blood Glucose.: 36 mg/dL (06-27-21 @ 20:31)  POCT Blood Glucose.: 136 mg/dL (06-27-21 @ 18:41)  POCT Blood Glucose.: 185 mg/dL (06-27-21 @ 18:27)    06-29    137  |  105  |  65<H>  ----------------------------<  205<H>  3.8   |  19<L>  |  2.81<H>    EGFR if : 29<L>  EGFR if non : 25<L>    Ca    8.1<L>      06-29  Mg     2.1     06-28  Phos  4.4     06-28    TPro  4.9<L>  /  Alb  2.9<L>  /  TBili  0.4  /  DBili  x   /  AST  15  /  ALT  23  /  AlkPhos  43  06-29          Thyroid Function Tests:  06-29 @ 10:23 TSH 0.02 FreeT4 1.4

## 2021-06-29 NOTE — PROGRESS NOTE ADULT - ASSESSMENT
EKG - NSR RBBB    a/p     1) Syncope - likely sec to hypoglycemia sec to glipizide, pt on glipizide as he is on prednisone, has h/o HOCM. echo here suggestive of HOCM as well    2) Renal cell cancer - f/u oncology     3) CKD - as per pt improving creatinine was > 3  EKG - NSR RBBB    a/p     1) Syncope - likely sec to hypoglycemia sec to glipizide, pt on glipizide as he is on prednisone, has h/o HOCM. echo here suggestive of HOCM as well, will get cardiac MRI without contrast    2) Renal cell cancer - f/u oncology     3) CKD - as per pt improving creatinine was > 3

## 2021-06-29 NOTE — CHART NOTE - NSCHARTNOTEFT_GEN_A_CORE
Cardiac MRI w/o contrast ordered as recommended by Dr Grande, cardiology.  Susan Membreno NP  [phone]

## 2021-06-29 NOTE — CONSULT NOTE ADULT - SUBJECTIVE AND OBJECTIVE BOX
[facility]  [phone]  NEPHROLOGY      INITIAL CONSULT NOTE  --------------------------------------------------------------------------------  HPI:    50 year old Male with PMH of HTN, HLD, Hypothyroidism, DMII, Renal cell ca s/p right nephrectomy, currently on prednisone, CKD, ?irregular heart beats, presents here after syncopal episode. Patient was at a [facility] when he started feeling sweaty, lightheaded, overall not feeling well.  He then lay down on the couch to rest, next thing he remembers is EMS was there trying to wake up him.  He was told that he passed out.  His finger stick at the time was 51.  In ED, patient had another episode of sweating and lightheadedness.  Repeat blood sugar was 36.  Patient was then given 1L D5 NS, octreotide. Patient states his pcp stopped his metformin, and started him on glipizide. He has recent blood work from June showing a creatinine of 2.9. takes losartan 50 mg daily, was discontinued today. renal consult called.     PAST HISTORY  --------------------------------------------------------------------------------  PAST MEDICAL & SURGICAL HISTORY:  HLD (hyperlipidemia)    DM2 (diabetes mellitus, type 2)    Hypothyroidism    Renal cell cancer    S/p nephrectomy    S/P LASIK surgery      FAMILY HISTORY:  FH: lung cancer (Uncle)      PAST SOCIAL HISTORY:      ALLERGIES & MEDICATIONS  --------------------------------------------------------------------------------  Allergies    adhesives (Hives)  lisinopril (Unknown)  shellfish (Unknown)  Zosyn (Hives)    Intolerances      Standing Inpatient Medications  amLODIPine   Tablet 10 milliGRAM(s) Oral daily  aspirin enteric coated 81 milliGRAM(s) Oral daily  atorvastatin 40 milliGRAM(s) Oral at bedtime  BACItracin   Ointment 1 Application(s) Topical two times a day  carvedilol 25 milliGRAM(s) Oral every 12 hours  dextrose 40% Gel 15 Gram(s) Oral once  dextrose 5%. 1000 milliLiter(s) IV Continuous <Continuous>  dextrose 5%. 1000 milliLiter(s) IV Continuous <Continuous>  dextrose 50% Injectable 25 Gram(s) IV Push once  dextrose 50% Injectable 12.5 Gram(s) IV Push once  dextrose 50% Injectable 25 Gram(s) IV Push once  gabapentin 100 milliGRAM(s) Oral daily  glucagon  Injectable 1 milliGRAM(s) IntraMuscular once  heparin   Injectable 5000 Unit(s) SubCutaneous every 8 hours  insulin lispro (ADMELOG) corrective regimen sliding scale   SubCutaneous three times a day before meals  insulin lispro (ADMELOG) corrective regimen sliding scale   SubCutaneous at bedtime  insulin lispro Injectable (ADMELOG) 8 Unit(s) SubCutaneous three times a day before meals  levothyroxine 200 MICROGram(s) Oral daily  multivitamin 1 Tablet(s) Oral daily  predniSONE   Tablet 30 milliGRAM(s) Oral two times a day    PRN Inpatient Medications      REVIEW OF SYSTEMS  --------------------------------------------------------------------------------  Gen: No fevers/chills   Skin: No rashes  Head/Eyes/Ears/Mouth: No headache; Normal hearing;  No sinus pain/discomfort, sore throat  Respiratory: No dyspnea, cough, wheezing, hemoptysis  CV: No chest pain, orthopnea  GI: No abdominal pain, diarrhea, nausea, vomiting, melena, hematochezia  : No dysuria, decrease urination or hesitancy urinating  hematuria, nocturia  MSK: No joint pain/swelling; no back pain  Neuro: No dizziness/lightheadedness, weakness, seizures, numbness, tingling  Heme: No easy bruising or bleeding  Endo: No heat/cold intolerance  Psych: No significant nervousness, anxiety or depression  also with no edema     All other systems were reviewed and are negative, except as noted.    VITALS/PHYSICAL EXAM  --------------------------------------------------------------------------------  T(C): 36.7 (06-29-21 @ 16:26), Max: 37.2 (06-29-21 @ 09:20)  HR: 60 (06-29-21 @ 16:26) (55 - 68)  BP: 159/93 (06-29-21 @ 16:26) (143/79 - 188/105)  RR: 18 (06-29-21 @ 16:26) (18 - 18)  SpO2: 98% (06-29-21 @ 16:26) (98% - 98%)  Wt(kg): --  Height (cm): 185.4 (06-27-21 @ 18:26)  Weight (kg): 99.8 (06-27-21 @ 18:26)  BMI (kg/m2): 29 (06-27-21 @ 18:26)  BSA (m2): 2.24 (06-27-21 @ 18:26)      06-29-21 @ 07:01  -  06-29-21 @ 18:09  --------------------------------------------------------  IN: 650 mL / OUT: 0 mL / NET: 650 mL      Physical Exam:  	Gen: Non toxic comfortable appearing   	Pulm: Lungs CTA, no rales or ronchi or wheezing  	CV: No JVD. RRR, S1S2;  	Abd: +BS, soft, nontender/nondistended, RLQ scar  	: No suprapubic tenderness  	UE: Warm, no cyanosis  no clubbing,  no edema;   	LE: Warm, no cyanosis  no clubbing, no edema  	Neuro: alert and oriented. speech coherent   	Psych: Normal affect and mood  	Skin: Warm, no decrease skin turgor     LABS/STUDIES  --------------------------------------------------------------------------------              11.7   8.98  >-----------<  229      [06-29-21 @ 06:50]              35.5     137  |  105  |  65  ----------------------------<  205      [06-29-21 @ 06:50]  3.8   |  19  |  2.81        Ca     8.1     [06-29-21 @ 06:50]      Mg     2.1     [06-28-21 @ 06:59]      Phos  4.4     [06-28-21 @ 06:59]    TPro  4.9  /  Alb  2.9  /  TBili  0.4  /  DBili  x   /  AST  15  /  ALT  23  /  AlkPhos  43  [06-29-21 @ 06:50]          Creatinine Trend:  SCr 2.81 [06-29 @ 06:50]  SCr 2.50 [06-28 @ 06:59]  SCr 2.43 [06-27 @ 19:10]        TSH 0.02      [06-29-21 @ 10:23]       [facility]  [phone]  NEPHROLOGY      INITIAL CONSULT NOTE  --------------------------------------------------------------------------------  HPI:    50 year old Male with PMH of HTN, HLD, Hypothyroidism, DMII, Renal cell ca s/p right nephrectomy, currently on prednisone, CKD, ?irregular heart beats, presents here after syncopal episode. Patient was at a [facility] when he started feeling sweaty, lightheaded, overall not feeling well.  He then lay down on the couch to rest, next thing he remembers is EMS was there trying to wake up him.  He was told that he passed out.  His finger stick at the time was 51.  In ED, patient had another episode of sweating and lightheadedness.  Repeat blood sugar was 36.  Patient was then given 1L D5 NS, octreotide. Patient states his pcp stopped his metformin, and started him on glipizide. He has recent blood work from June showing a creatinine of 2.9.[ as pt showed his lab results on his phone ] takes losartan 50 mg daily, was discontinued today. renal consult called.     PAST HISTORY  --------------------------------------------------------------------------------  PAST MEDICAL & SURGICAL HISTORY:  HLD (hyperlipidemia)    DM2 (diabetes mellitus, type 2)    Hypothyroidism    Renal cell cancer    S/p nephrectomy    S/P LASIK surgery      FAMILY HISTORY:  FH: lung cancer (Uncle)      PAST SOCIAL HISTORY:      ALLERGIES & MEDICATIONS  --------------------------------------------------------------------------------  Allergies    adhesives (Hives)  lisinopril (Unknown)  shellfish (Unknown)  Zosyn (Hives)    Intolerances      Standing Inpatient Medications  amLODIPine   Tablet 10 milliGRAM(s) Oral daily  aspirin enteric coated 81 milliGRAM(s) Oral daily  atorvastatin 40 milliGRAM(s) Oral at bedtime  BACItracin   Ointment 1 Application(s) Topical two times a day  carvedilol 25 milliGRAM(s) Oral every 12 hours  dextrose 40% Gel 15 Gram(s) Oral once  dextrose 5%. 1000 milliLiter(s) IV Continuous <Continuous>  dextrose 5%. 1000 milliLiter(s) IV Continuous <Continuous>  dextrose 50% Injectable 25 Gram(s) IV Push once  dextrose 50% Injectable 12.5 Gram(s) IV Push once  dextrose 50% Injectable 25 Gram(s) IV Push once  gabapentin 100 milliGRAM(s) Oral daily  glucagon  Injectable 1 milliGRAM(s) IntraMuscular once  heparin   Injectable 5000 Unit(s) SubCutaneous every 8 hours  insulin lispro (ADMELOG) corrective regimen sliding scale   SubCutaneous three times a day before meals  insulin lispro (ADMELOG) corrective regimen sliding scale   SubCutaneous at bedtime  insulin lispro Injectable (ADMELOG) 8 Unit(s) SubCutaneous three times a day before meals  levothyroxine 200 MICROGram(s) Oral daily  multivitamin 1 Tablet(s) Oral daily  predniSONE   Tablet 30 milliGRAM(s) Oral two times a day    PRN Inpatient Medications      REVIEW OF SYSTEMS  --------------------------------------------------------------------------------  Gen: No fevers/chills   Skin: No rashes  Head/Eyes/Ears/Mouth: No headache; Normal hearing;  No sinus pain/discomfort, sore throat  Respiratory: No dyspnea, cough, wheezing, hemoptysis  CV: No chest pain, orthopnea  GI: No abdominal pain, diarrhea, nausea, vomiting, melena, hematochezia  : No dysuria, decrease urination or hesitancy urinating  hematuria, nocturia  MSK: No joint pain/swelling; no back pain  Neuro: No dizziness/lightheadedness, weakness, seizures, numbness, tingling  Heme: No easy bruising or bleeding  Endo: No heat/cold intolerance  Psych: No significant nervousness, anxiety or depression  also with no edema     All other systems were reviewed and are negative, except as noted.    VITALS/PHYSICAL EXAM  --------------------------------------------------------------------------------  T(C): 36.7 (06-29-21 @ 16:26), Max: 37.2 (06-29-21 @ 09:20)  HR: 60 (06-29-21 @ 16:26) (55 - 68)  BP: 159/93 (06-29-21 @ 16:26) (143/79 - 188/105)  RR: 18 (06-29-21 @ 16:26) (18 - 18)  SpO2: 98% (06-29-21 @ 16:26) (98% - 98%)  Wt(kg): --  Height (cm): 185.4 (06-27-21 @ 18:26)  Weight (kg): 99.8 (06-27-21 @ 18:26)  BMI (kg/m2): 29 (06-27-21 @ 18:26)  BSA (m2): 2.24 (06-27-21 @ 18:26)      06-29-21 @ 07:01  -  06-29-21 @ 18:09  --------------------------------------------------------  IN: 650 mL / OUT: 0 mL / NET: 650 mL      Physical Exam:  	Gen: Non toxic comfortable appearing   	Pulm: Lungs CTA, no rales or ronchi or wheezing  	CV: No JVD. RRR, S1S2;  	Abd: +BS, soft, nontender/nondistended, RLQ scar  	: No suprapubic tenderness  	UE: Warm, no cyanosis  no clubbing,  no edema;   	LE: Warm, no cyanosis  no clubbing, no edema  	Neuro: alert and oriented. speech coherent   	Psych: Normal affect and mood  	Skin: Warm, no decrease skin turgor     LABS/STUDIES  --------------------------------------------------------------------------------              11.7   8.98  >-----------<  229      [06-29-21 @ 06:50]              35.5     137  |  105  |  65  ----------------------------<  205      [06-29-21 @ 06:50]  3.8   |  19  |  2.81        Ca     8.1     [06-29-21 @ 06:50]      Mg     2.1     [06-28-21 @ 06:59]      Phos  4.4     [06-28-21 @ 06:59]    TPro  4.9  /  Alb  2.9  /  TBili  0.4  /  DBili  x   /  AST  15  /  ALT  23  /  AlkPhos  43  [06-29-21 @ 06:50]          Creatinine Trend:  SCr 2.81 [06-29 @ 06:50]  SCr 2.50 [06-28 @ 06:59]  SCr 2.43 [06-27 @ 19:10]        TSH 0.02      [06-29-21 @ 10:23]

## 2021-06-29 NOTE — PROGRESS NOTE ADULT - SUBJECTIVE AND OBJECTIVE BOX
Frandy Grande MD  Interventional Cardiology / Advance Heart Failure and Cardiac Transplant Specialist  [place] Office : [address] [place] [address] [place]. [address]  Tel:   [place] Office : [address] [facility] [place] [address]  Tel: [phone]  Cell : [phone]    Subjective/Overnight events: Pt is lying in bed comfortable not in distress, no chest pains no SOB no palpitations  	  MEDICATIONS:  amLODIPine   Tablet 10 milliGRAM(s) Oral daily  aspirin enteric coated 81 milliGRAM(s) Oral daily  carvedilol 25 milliGRAM(s) Oral every 12 hours  heparin   Injectable 5000 Unit(s) SubCutaneous every 8 hours        gabapentin 100 milliGRAM(s) Oral daily      atorvastatin 40 milliGRAM(s) Oral at bedtime  dextrose 40% Gel 15 Gram(s) Oral once  dextrose 50% Injectable 25 Gram(s) IV Push once  dextrose 50% Injectable 12.5 Gram(s) IV Push once  dextrose 50% Injectable 25 Gram(s) IV Push once  glucagon  Injectable 1 milliGRAM(s) IntraMuscular once  insulin lispro (ADMELOG) corrective regimen sliding scale   SubCutaneous three times a day before meals  insulin lispro (ADMELOG) corrective regimen sliding scale   SubCutaneous at bedtime  insulin lispro Injectable (ADMELOG) 8 Unit(s) SubCutaneous three times a day before meals  levothyroxine 200 MICROGram(s) Oral daily  predniSONE   Tablet 30 milliGRAM(s) Oral two times a day    BACItracin   Ointment 1 Application(s) Topical two times a day  dextrose 5%. 1000 milliLiter(s) IV Continuous <Continuous>  dextrose 5%. 1000 milliLiter(s) IV Continuous <Continuous>  multivitamin 1 Tablet(s) Oral daily      PAST MEDICAL/SURGICAL HISTORY  PAST MEDICAL & SURGICAL HISTORY:  HLD (hyperlipidemia)    DM2 (diabetes mellitus, type 2)    Hypothyroidism    Renal cell cancer    S/p nephrectomy    S/P LASIK surgery        SOCIAL HISTORY: Substance Use (street drugs): ( x ) never used  (  ) other:    FAMILY HISTORY:  FH: lung cancer (Uncle)        REVIEW OF SYSTEMS:  CONSTITUTIONAL: No fever, weight loss, or fatigue  EYES: No eye pain, visual disturbances, or discharge  ENMT:  No difficulty hearing, tinnitus, vertigo; No sinus or throat pain  BREASTS: No pain, masses, or nipple discharge  GASTROINTESTINAL: No abdominal or epigastric pain. No nausea, vomiting, or hematemesis; No diarrhea or constipation. No melena or hematochezia.  GENITOURINARY: No dysuria, frequency, hematuria, or incontinence  NEUROLOGICAL: No headaches, memory loss, loss of strength, numbness, or tremors  ENDOCRINE: No heat or cold intolerance; No hair loss  MUSCULOSKELETAL: No joint pain or swelling; No muscle, back, or extremity pain  PSYCHIATRIC: No depression, anxiety, mood swings, or difficulty sleeping  HEME/LYMPH: No easy bruising, or bleeding gums  All others negative    PHYSICAL EXAM:  T(C): 37.2 (06-29-21 @ 09:20), Max: 37.2 (06-29-21 @ 09:20)  HR: 66 (06-29-21 @ 09:20) (55 - 68)  BP: 143/79 (06-29-21 @ 09:20) (143/79 - 188/105)  RR: 18 (06-29-21 @ 09:20) (18 - 18)  SpO2: 98% (06-29-21 @ 09:20) (98% - 98%)  Wt(kg): --  I&O's Summary    29 Jun 2021 07:01  -  29 Jun 2021 14:35  --------------------------------------------------------  IN: 650 mL / OUT: 0 mL / NET: 650 mL        EYES: EOMI, PERRLA, conjunctiva and sclera clear  ENMT: No tonsillar erythema, exudates, or enlargement; Moist mucous membranes, Good dentition, No lesions  Cardiovascular: Normal S1 S2, No JVD, No murmurs, No edema  Respiratory: Lungs clear to auscultation	  Gastrointestinal:  Soft, Non-tender, + BS	  Extremities: no edema                                    11.7   8.98  )-----------( 229      ( 29 Jun 2021 06:50 )             35.5     06-29    137  |  105  |  65<H>  ----------------------------<  205<H>  3.8   |  19<L>  |  2.81<H>    Ca    8.1<L>      29 Jun 2021 06:50  Phos  4.4     06-28  Mg     2.1     06-28    TPro  4.9<L>  /  Alb  2.9<L>  /  TBili  0.4  /  DBili  x   /  AST  15  /  ALT  23  /  AlkPhos  43  06-29    proBNP:   Lipid Profile:   HgA1c:   TSH: Thyroid Stimulating Hormone, Serum: 0.02 uIU/mL (06-29 @ 10:23)      Consultant(s) Notes Reviewed:  [x ] YES  [ ] NO    Care Discussed with Consultants/Other Providers [ x] YES  [ ] NO    Imaging Personally Reviewed independently:  [x] YES  [ ] NO    All labs, radiologic studies, vitals, orders and medications list reviewed. Patient is seen and examined at bedside. Case discussed with medical team.

## 2021-06-29 NOTE — PROGRESS NOTE ADULT - PROBLEM SELECTOR PLAN 2
hold glipizide for now  iss  endo f/u
hold glipizide for now  iss  endo f/u
-check 0.02 and FT4 is 1.4. If patient's hypothyroidism if from his thyroid then his dose should be adjusted but if its from his pituitary, cotninue as is. Tried to called his outpatient doctor: Dr. Kennedy [phone] to clarify but got a random vm.   -continue Synthroid 200mcg po qdaily - an hour before he eats/takes meds.

## 2021-06-29 NOTE — CONSULT NOTE ADULT - ASSESSMENT
50 year old Male with PMH of HTN, HLD, Hypothyroidism, DMII, Renal cell ca s/p right nephrectomy, currently on prednisone, CKD, ?irregular heart beats, presents here after syncopal episode.     1- CKD III  2- HTN  3- DM2  3- Renal cell carcinoma      baseline creatinine 2.9 from recent blood work  check U/A  check urine/protein creatine ratio  monitor creatinine and electrolytes daily  continue norvasc 10 mg daily  hold losartan for now.   on coreg 25 mg BID  insulin sliding scale.  endo following  on steroids 50 year old Male with PMH of HTN, HLD, Hypothyroidism, DMII, Renal cell ca s/p right nephrectomy, currently on prednisone, CKD, ?irregular heart beats, presents here after syncopal episode.     1- CKD III with joanne   2- HTN  3- DM2  3- Renal cell carcinoma      baseline creatinine 2.9 from recent blood work  check U/A  check urine/protein creatine ratio  monitor creatinine and electrolytes daily  continue norvasc 10 mg daily  hold losartan for now.   on coreg 25 mg BID  insulin sliding scale.  endo following  on steroids

## 2021-06-29 NOTE — PROGRESS NOTE ADULT - PROBLEM SELECTOR PLAN 5
s/p right nephrectomy  was on immunotherapy, that was recently discontinued, currently on prednisone taper  monitor for now  f/u as outaptient
s/p right nephrectomy  was on immunotherapy, that was recently discontinued, currently on prednisone taper  monitor for now  f/u as outpatient

## 2021-06-29 NOTE — PROGRESS NOTE ADULT - PROBLEM SELECTOR PLAN 3
cr raising   as per pt - creatinine was > 3 in may   asking for renal eval
-BP is less than 130/80. continue amlodipine and losartan.
creatinine improving per patient  creatinine 2.9 on June 14, prior to that was 3.6  monitor BMP closely  c/w home meds including losartan

## 2021-06-29 NOTE — PROGRESS NOTE ADULT - SUBJECTIVE AND OBJECTIVE BOX
SUBJECTIVE / OVERNIGHT EVENTS: pt seen and examined    MEDICATIONS  (STANDING):  amLODIPine   Tablet 10 milliGRAM(s) Oral daily  aspirin enteric coated 81 milliGRAM(s) Oral daily  atorvastatin 40 milliGRAM(s) Oral at bedtime  BACItracin   Ointment 1 Application(s) Topical two times a day  carvedilol 25 milliGRAM(s) Oral every 12 hours  chlorhexidine 2% Cloths 1 Application(s) Topical daily  dextrose 40% Gel 15 Gram(s) Oral once  dextrose 5%. 1000 milliLiter(s) (50 mL/Hr) IV Continuous <Continuous>  dextrose 5%. 1000 milliLiter(s) (100 mL/Hr) IV Continuous <Continuous>  dextrose 50% Injectable 25 Gram(s) IV Push once  dextrose 50% Injectable 12.5 Gram(s) IV Push once  dextrose 50% Injectable 25 Gram(s) IV Push once  gabapentin 100 milliGRAM(s) Oral daily  glucagon  Injectable 1 milliGRAM(s) IntraMuscular once  heparin   Injectable 5000 Unit(s) SubCutaneous every 8 hours  insulin glargine Injectable (LANTUS) 16 Unit(s) SubCutaneous at bedtime  insulin lispro (ADMELOG) corrective regimen sliding scale   SubCutaneous three times a day before meals  insulin lispro (ADMELOG) corrective regimen sliding scale   SubCutaneous at bedtime  insulin lispro Injectable (ADMELOG) 10 Unit(s) SubCutaneous three times a day before meals  levothyroxine 200 MICROGram(s) Oral daily  multivitamin 1 Tablet(s) Oral daily  predniSONE   Tablet 30 milliGRAM(s) Oral two times a day    MEDICATIONS  (PRN):    Vital Signs Last 24 Hrs  T(C): 36.7 (2021 16:26), Max: 37.2 (2021 09:20)  T(F): 98.1 (2021 16:26), Max: 99 (2021 09:20)  HR: 60 (2021 16:26) (55 - 66)  BP: 159/93 (2021 16:26) (143/79 - 179/96)  BP(mean): --  RR: 18 (2021 16:26) (18 - 18)  SpO2: 98% (2021 16:26) (98% - 98%)    Constitutional: No fever, fatigue  Skin: No rash.  Eyes: No recent vision problems or eye pain.  ENT: No congestion, ear pain, or sore throat.  Cardiovascular: No chest pain or palpation.  Respiratory: No cough, shortness of breath, congestion, or wheezing.  Gastrointestinal: No abdominal pain, nausea, vomiting, or diarrhea.  Genitourinary: No dysuria.  Musculoskeletal: No joint swelling.  Neurologic: No headache.    PHYSICAL EXAM:  GENERAL: NAD  EYES: EOMI, PERRLA  NECK: Supple, No JVD  CHEST/LUNG: cta chris  HEART:  S1 , S2 +  ABDOMEN: soft , bs+  EXTREMITIES:  no edema  NEUROLOGY:alert awake    LABS:      137  |  105  |  65<H>  ----------------------------<  205<H>  3.8   |  19<L>  |  2.81<H>    Ca    8.1<L>      2021 06:50  Phos  4.4       Mg     2.1         TPro  4.9<L>  /  Alb  2.9<L>  /  TBili  0.4  /  DBili      /  AST  15  /  ALT  23  /  AlkPhos  43      Creatinine Trend: 2.81 <--, 2.50 <--, 2.43 <--                        11.7   8.98  )-----------( 229      ( 2021 06:50 )             35.5     Urine Studies:  Urinalysis Basic - ( 2021 20:08 )    Color: Light Yellow / Appearance: Clear / S.015 / pH:   Gluc:  / Ketone: Negative  / Bili: Negative / Urobili: Negative   Blood:  / Protein: 300 mg/dL / Nitrite: Negative   Leuk Esterase: Negative / RBC: 1 /hpf / WBC 1 /HPF   Sq Epi:  / Non Sq Epi: 0 /hpf / Bacteria: Negative      Creatinine, Random Urine: 98 mg/dL ( @ 20:08)  Protein/Creatinine Ratio Calculation: 1.6 Ratio ( @ 20:08)          LIVER FUNCTIONS - ( 2021 06:50 )  Alb: 2.9 g/dL / Pro: 4.9 g/dL / ALK PHOS: 43 U/L / ALT: 23 U/L / AST: 15 U/L / GGT: x

## 2021-06-29 NOTE — PROGRESS NOTE ADULT - PROBLEM SELECTOR PLAN 1
improving
improving
No further hypoglycemia so will start basal insulin: Lantus 16 units sq qhs  Increase admelog to 10 units sq tid-ac  Small scale tid-ac/qhs  HbA1c is 6.2%, so he probably has frequent hypoglycemia.  Change to renal/cardiac/diabetic diet  DISPO: [id number]  F/u with his PCP.

## 2021-06-29 NOTE — PROGRESS NOTE ADULT - PROBLEM SELECTOR PLAN 7
heparin sc
heparin sc    had extensive lengthy discussion with pt in detail in length about pts current clinical status , management plan   all questions/ concerns answered   [facility] code [id number]

## 2021-06-29 NOTE — CONSULT NOTE ADULT - ATTENDING COMMENTS
Seen, examined, and  agree with above as scribed by NP Jessica    pt with ckd III with nephrectomy now with Erasmo in setting of recent immunomodulator use as pt has informed us  for now hold losartan for his htn and start norvasc as above

## 2021-06-29 NOTE — PROGRESS NOTE ADULT - ASSESSMENT
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 Endocrine consulted for hypoglycemia.

## 2021-06-30 ENCOUNTER — TRANSCRIPTION ENCOUNTER (OUTPATIENT)
Age: 51
End: 2021-06-30

## 2021-06-30 VITALS
HEART RATE: 66 BPM | SYSTOLIC BLOOD PRESSURE: 153 MMHG | OXYGEN SATURATION: 97 % | DIASTOLIC BLOOD PRESSURE: 85 MMHG | TEMPERATURE: 98 F | RESPIRATION RATE: 18 BRPM

## 2021-06-30 DIAGNOSIS — R55 SYNCOPE AND COLLAPSE: ICD-10-CM

## 2021-06-30 LAB
A1C WITH ESTIMATED AVERAGE GLUCOSE RESULT: 6.3 % — HIGH (ref 4–5.6)
ANION GAP SERPL CALC-SCNC: 15 MMOL/L — SIGNIFICANT CHANGE UP (ref 5–17)
BUN SERPL-MCNC: 74 MG/DL — HIGH (ref 7–23)
CALCIUM SERPL-MCNC: 8.5 MG/DL — SIGNIFICANT CHANGE UP (ref 8.4–10.5)
CHLORIDE SERPL-SCNC: 105 MMOL/L — SIGNIFICANT CHANGE UP (ref 96–108)
CO2 SERPL-SCNC: 17 MMOL/L — LOW (ref 22–31)
CREAT SERPL-MCNC: 2.92 MG/DL — HIGH (ref 0.5–1.3)
ESTIMATED AVERAGE GLUCOSE: 134 MG/DL — HIGH (ref 68–114)
GLUCOSE BLDC GLUCOMTR-MCNC: 147 MG/DL — HIGH (ref 70–99)
GLUCOSE BLDC GLUCOMTR-MCNC: 268 MG/DL — HIGH (ref 70–99)
GLUCOSE BLDC GLUCOMTR-MCNC: 94 MG/DL — SIGNIFICANT CHANGE UP (ref 70–99)
GLUCOSE SERPL-MCNC: 234 MG/DL — HIGH (ref 70–99)
POTASSIUM SERPL-MCNC: 4.2 MMOL/L — SIGNIFICANT CHANGE UP (ref 3.5–5.3)
POTASSIUM SERPL-SCNC: 4.2 MMOL/L — SIGNIFICANT CHANGE UP (ref 3.5–5.3)
SODIUM SERPL-SCNC: 137 MMOL/L — SIGNIFICANT CHANGE UP (ref 135–145)

## 2021-06-30 PROCEDURE — C8929: CPT

## 2021-06-30 PROCEDURE — U0005: CPT

## 2021-06-30 PROCEDURE — 99232 SBSQ HOSP IP/OBS MODERATE 35: CPT

## 2021-06-30 PROCEDURE — 84156 ASSAY OF PROTEIN URINE: CPT

## 2021-06-30 PROCEDURE — 82570 ASSAY OF URINE CREATININE: CPT

## 2021-06-30 PROCEDURE — 85025 COMPLETE CBC W/AUTO DIFF WBC: CPT

## 2021-06-30 PROCEDURE — 99285 EMERGENCY DEPT VISIT HI MDM: CPT | Mod: 25

## 2021-06-30 PROCEDURE — 84100 ASSAY OF PHOSPHORUS: CPT

## 2021-06-30 PROCEDURE — 81001 URINALYSIS AUTO W/SCOPE: CPT

## 2021-06-30 PROCEDURE — 85027 COMPLETE CBC AUTOMATED: CPT

## 2021-06-30 PROCEDURE — 83735 ASSAY OF MAGNESIUM: CPT

## 2021-06-30 PROCEDURE — 93005 ELECTROCARDIOGRAM TRACING: CPT

## 2021-06-30 PROCEDURE — 86769 SARS-COV-2 COVID-19 ANTIBODY: CPT

## 2021-06-30 PROCEDURE — 84439 ASSAY OF FREE THYROXINE: CPT

## 2021-06-30 PROCEDURE — 82962 GLUCOSE BLOOD TEST: CPT

## 2021-06-30 PROCEDURE — 84443 ASSAY THYROID STIM HORMONE: CPT

## 2021-06-30 PROCEDURE — 96372 THER/PROPH/DIAG INJ SC/IM: CPT

## 2021-06-30 PROCEDURE — 80048 BASIC METABOLIC PNL TOTAL CA: CPT

## 2021-06-30 PROCEDURE — 80053 COMPREHEN METABOLIC PANEL: CPT

## 2021-06-30 PROCEDURE — U0003: CPT

## 2021-06-30 PROCEDURE — 83036 HEMOGLOBIN GLYCOSYLATED A1C: CPT

## 2021-06-30 RX ORDER — ASPIRIN/CALCIUM CARB/MAGNESIUM 324 MG
1 TABLET ORAL
Qty: 0 | Refills: 0 | DISCHARGE
Start: 2021-06-30

## 2021-06-30 RX ORDER — LEVOTHYROXINE SODIUM 125 MCG
1 TABLET ORAL
Qty: 0 | Refills: 0 | DISCHARGE
Start: 2021-06-30

## 2021-06-30 RX ORDER — AMLODIPINE BESYLATE 2.5 MG/1
1 TABLET ORAL
Qty: 0 | Refills: 0 | DISCHARGE

## 2021-06-30 RX ORDER — REPAGLINIDE 1 MG/1
1 TABLET ORAL
Qty: 90 | Refills: 0
Start: 2021-06-30 | End: 2021-07-29

## 2021-06-30 RX ORDER — LOSARTAN POTASSIUM 100 MG/1
1 TABLET, FILM COATED ORAL
Qty: 0 | Refills: 0 | DISCHARGE

## 2021-06-30 RX ORDER — MILK THISTLE 150 MG
0 CAPSULE ORAL
Qty: 0 | Refills: 0 | DISCHARGE

## 2021-06-30 RX ORDER — CARVEDILOL PHOSPHATE 80 MG/1
1 CAPSULE, EXTENDED RELEASE ORAL
Qty: 0 | Refills: 0 | DISCHARGE

## 2021-06-30 RX ORDER — GABAPENTIN 400 MG/1
1 CAPSULE ORAL
Qty: 0 | Refills: 0 | DISCHARGE
Start: 2021-06-30

## 2021-06-30 RX ORDER — ASPIRIN/CALCIUM CARB/MAGNESIUM 324 MG
1 TABLET ORAL
Qty: 0 | Refills: 0 | DISCHARGE

## 2021-06-30 RX ORDER — CARVEDILOL PHOSPHATE 80 MG/1
1 CAPSULE, EXTENDED RELEASE ORAL
Qty: 0 | Refills: 0 | DISCHARGE
Start: 2021-06-30

## 2021-06-30 RX ORDER — AMLODIPINE BESYLATE 2.5 MG/1
1 TABLET ORAL
Qty: 0 | Refills: 0 | DISCHARGE
Start: 2021-06-30

## 2021-06-30 RX ORDER — LEVOTHYROXINE SODIUM 125 MCG
1 TABLET ORAL
Qty: 0 | Refills: 0 | DISCHARGE

## 2021-06-30 RX ORDER — INSULIN LISPRO 100/ML
5 VIAL (ML) SUBCUTANEOUS ONCE
Refills: 0 | Status: COMPLETED | OUTPATIENT
Start: 2021-06-30 | End: 2021-06-30

## 2021-06-30 RX ORDER — REPAGLINIDE 1 MG/1
0.5 TABLET ORAL
Qty: 45 | Refills: 0
Start: 2021-06-30 | End: 2021-07-29

## 2021-06-30 RX ADMIN — Medication 30 MILLIGRAM(S): at 17:18

## 2021-06-30 RX ADMIN — Medication 3: at 17:17

## 2021-06-30 RX ADMIN — Medication 1 APPLICATION(S): at 05:29

## 2021-06-30 RX ADMIN — Medication 1 TABLET(S): at 12:25

## 2021-06-30 RX ADMIN — CHLORHEXIDINE GLUCONATE 1 APPLICATION(S): 213 SOLUTION TOPICAL at 12:28

## 2021-06-30 RX ADMIN — AMLODIPINE BESYLATE 10 MILLIGRAM(S): 2.5 TABLET ORAL at 05:30

## 2021-06-30 RX ADMIN — CARVEDILOL PHOSPHATE 25 MILLIGRAM(S): 80 CAPSULE, EXTENDED RELEASE ORAL at 05:30

## 2021-06-30 RX ADMIN — HEPARIN SODIUM 5000 UNIT(S): 5000 INJECTION INTRAVENOUS; SUBCUTANEOUS at 13:39

## 2021-06-30 RX ADMIN — Medication 1 APPLICATION(S): at 17:18

## 2021-06-30 RX ADMIN — Medication 10 UNIT(S): at 07:46

## 2021-06-30 RX ADMIN — CARVEDILOL PHOSPHATE 25 MILLIGRAM(S): 80 CAPSULE, EXTENDED RELEASE ORAL at 17:21

## 2021-06-30 RX ADMIN — Medication 30 MILLIGRAM(S): at 07:50

## 2021-06-30 RX ADMIN — Medication 5 UNIT(S): at 12:29

## 2021-06-30 RX ADMIN — Medication 200 MICROGRAM(S): at 05:28

## 2021-06-30 RX ADMIN — Medication 10 UNIT(S): at 17:17

## 2021-06-30 RX ADMIN — GABAPENTIN 100 MILLIGRAM(S): 400 CAPSULE ORAL at 12:25

## 2021-06-30 RX ADMIN — HEPARIN SODIUM 5000 UNIT(S): 5000 INJECTION INTRAVENOUS; SUBCUTANEOUS at 05:30

## 2021-06-30 RX ADMIN — Medication 81 MILLIGRAM(S): at 12:24

## 2021-06-30 NOTE — DISCHARGE NOTE PROVIDER - CARE PROVIDERS DIRECT ADDRESSES
,DirectAddress_Unknown,DirectAddress_Unknown ,DirectAddress_Unknown,DirectAddress_Unknown,[email or web address]

## 2021-06-30 NOTE — DISCHARGE NOTE PROVIDER - CARE PROVIDER_API CALL
Adrian Khan  Nephrologist  Phone: (   )    -  Fax: (   )    -  Follow Up Time:     Frandy Grande (MD)  Cardiovascular Disease; Internal Medicine  [address] [place] [address]  Phone: [phone]  Fax: [phone]  Follow Up Time:    Frandy Grande)  Cardiovascular Disease; Internal Medicine  [address] [place] [address]  Phone: [phone]  Fax: [phone]  Follow Up Time:     Adrian Schafer)  Internal Medicine; Nephrology  [facility] [place] [address]  Phone: [phone]  Fax: [phone]  Follow Up Time:     Susi Byers)  EndocrinologyMetabDiabetes  [address] [place] [address]  Phone: [phone]  Fax: [phone]  Follow Up Time:

## 2021-06-30 NOTE — PROGRESS NOTE ADULT - PROBLEM SELECTOR PROBLEM 2
Acquired hypothyroidism
Acquired hypothyroidism
DM2 (diabetes mellitus, type 2)
DM2 (diabetes mellitus, type 2)

## 2021-06-30 NOTE — DISCHARGE NOTE NURSING/CASE MANAGEMENT/SOCIAL WORK - PATIENT PORTAL LINK FT
You can access the FollowMyHealth Patient Portal offered by [facility] by registering at the following website: [email or web address]. By joining [facility]’s FollowMyHealth portal, you will also be able to view your health information using other applications (apps) compatible with our system.

## 2021-06-30 NOTE — PROGRESS NOTE ADULT - SUBJECTIVE AND OBJECTIVE BOX
Frandy Grande MD  Interventional Cardiology / Advance Heart Failure and Cardiac Transplant Specialist  [place] Office : [address] [place] [address] [place]. [address]  Tel:   [place] Office : [phone] [facility] [place] [address]  Tel: [phone]  Cell : [phone]    Subjective/Overnight events: Pt is lying in bed comfortable not in distress, no chest pains no SOB no palpitations  	  MEDICATIONS:  amLODIPine   Tablet 10 milliGRAM(s) Oral daily  aspirin enteric coated 81 milliGRAM(s) Oral daily  carvedilol 25 milliGRAM(s) Oral every 12 hours  heparin   Injectable 5000 Unit(s) SubCutaneous every 8 hours        gabapentin 100 milliGRAM(s) Oral daily      atorvastatin 40 milliGRAM(s) Oral at bedtime  dextrose 40% Gel 15 Gram(s) Oral once  dextrose 50% Injectable 25 Gram(s) IV Push once  dextrose 50% Injectable 12.5 Gram(s) IV Push once  dextrose 50% Injectable 25 Gram(s) IV Push once  glucagon  Injectable 1 milliGRAM(s) IntraMuscular once  insulin glargine Injectable (LANTUS) 16 Unit(s) SubCutaneous at bedtime  insulin lispro (ADMELOG) corrective regimen sliding scale   SubCutaneous three times a day before meals  insulin lispro (ADMELOG) corrective regimen sliding scale   SubCutaneous at bedtime  insulin lispro Injectable (ADMELOG) 10 Unit(s) SubCutaneous three times a day before meals  levothyroxine 200 MICROGram(s) Oral daily  predniSONE   Tablet 30 milliGRAM(s) Oral two times a day    BACItracin   Ointment 1 Application(s) Topical two times a day  chlorhexidine 2% Cloths 1 Application(s) Topical daily  dextrose 5%. 1000 milliLiter(s) IV Continuous <Continuous>  dextrose 5%. 1000 milliLiter(s) IV Continuous <Continuous>  multivitamin 1 Tablet(s) Oral daily      PAST MEDICAL/SURGICAL HISTORY  PAST MEDICAL & SURGICAL HISTORY:  HLD (hyperlipidemia)    DM2 (diabetes mellitus, type 2)    Hypothyroidism    Renal cell cancer    S/p nephrectomy    S/P LASIK surgery        SOCIAL HISTORY: Substance Use (street drugs): ( x ) never used  (  ) other:    FAMILY HISTORY:  FH: lung cancer (Uncle)        REVIEW OF SYSTEMS:  CONSTITUTIONAL: No fever, weight loss, or fatigue  EYES: No eye pain, visual disturbances, or discharge  ENMT:  No difficulty hearing, tinnitus, vertigo; No sinus or throat pain  BREASTS: No pain, masses, or nipple discharge  GASTROINTESTINAL: No abdominal or epigastric pain. No nausea, vomiting, or hematemesis; No diarrhea or constipation. No melena or hematochezia.  GENITOURINARY: No dysuria, frequency, hematuria, or incontinence  NEUROLOGICAL: No headaches, memory loss, loss of strength, numbness, or tremors  ENDOCRINE: No heat or cold intolerance; No hair loss  MUSCULOSKELETAL: No joint pain or swelling; No muscle, back, or extremity pain  PSYCHIATRIC: No depression, anxiety, mood swings, or difficulty sleeping  HEME/LYMPH: No easy bruising, or bleeding gums  All others negative    PHYSICAL EXAM:  T(C): 36.5 (06-30-21 @ 09:20), Max: 37 (06-29-21 @ 23:37)  HR: 89 (06-30-21 @ 09:20) (59 - 89)  BP: 147/79 (06-30-21 @ 09:20) (147/79 - 168/96)  RR: 18 (06-30-21 @ 09:20) (18 - 18)  SpO2: 96% (06-30-21 @ 09:20) (96% - 98%)  Wt(kg): --  I&O's Summary    29 Jun 2021 07:01  -  30 Jun 2021 07:00  --------------------------------------------------------  IN: 650 mL / OUT: 0 mL / NET: 650 mL    30 Jun 2021 07:01  -  30 Jun 2021 11:02  --------------------------------------------------------  IN: 320 mL / OUT: 0 mL / NET: 320 mL          EYES: EOMI, PERRLA, conjunctiva and sclera clear  ENMT: No tonsillar erythema, exudates, or enlargement; Moist mucous membranes, Good dentition, No lesions  Cardiovascular: Normal S1 S2, No JVD, No murmurs, No edema  Respiratory: Lungs clear to auscultation	  Gastrointestinal:  Soft, Non-tender, + BS	  Extremities: no edema                                  11.7   8.98  )-----------( 229      ( 29 Jun 2021 06:50 )             35.5     06-30    137  |  105  |  74<H>  ----------------------------<  234<H>  4.2   |  17<L>  |  2.92<H>    Ca    8.5      30 Jun 2021 06:16    TPro  4.9<L>  /  Alb  2.9<L>  /  TBili  0.4  /  DBili  x   /  AST  15  /  ALT  23  /  AlkPhos  43  06-29    proBNP:   Lipid Profile:   HgA1c:   TSH:     Consultant(s) Notes Reviewed:  [x ] YES  [ ] NO    Care Discussed with Consultants/Other Providers [ x] YES  [ ] NO    Imaging Personally Reviewed independently:  [x] YES  [ ] NO    All labs, radiologic studies, vitals, orders and medications list reviewed. Patient is seen and examined at bedside. Case discussed with medical team.

## 2021-06-30 NOTE — PROGRESS NOTE ADULT - ASSESSMENT
51 yo M with PMH of HTN, HLD, Hypothyroidism, DMII, Renal cell ca s/p right nephrectomy, currently on prednisone, CKD, ?irregular heart beats, presents here after syncopal episode.      EKG - NSR RBBB      1) Syncope   - likely sec to hypoglycemia sec to glipizide, pt on glipizide as he is on prednisone  - has h/o HOCM. echo here suggestive of HOCM as well  -will get cardiac MRI without contrast--called by radiologist patient can receive gadolinium despite CKD. will get renal input and clearance    2) Renal cell cancer   - f/u oncology     3) CKD   - as per pt improving creatinine was > 3

## 2021-06-30 NOTE — CHART NOTE - NSCHARTNOTEFT_GEN_A_CORE
When decided by Dr Fleming that cardiac MRI will be wo contrast by this time hours already gone--because I called several doctors, waiting for Dragan Oliva to call me back--took another hour or two, then I called renal NP Devin to find out why Dragan Oliva is not calling me , so she said she is gonna tell Dr Grande but he never called me. So I called him again and ultimately spoke to him and he said "go with contrast"-- so I ordered with contrast and came to the pt to him but he said "no,  the lady doctor told me absolutely no contrast" . I called Dr Fleming  and confirmed with her that there won't be any contrast. So I discontinued my order and the previous order is still there. I called  the Rad  (Dr Durant--[phone]) to let him know that MRI will be wo contrast. I requested him whether it can be done to-night. He said he can't do the test today--will do it tomorrow at 8 am.   I came to the pt again to tell him that the MRI will be tomorrow at 8 am.   He was angry and he wants to leave. Called Dr Fleming and said he will go on AMA. I told the pt that he needs to sign out on AMA. He got more upset angry. Called Dr Fleming again and she decided to discharge him.  I called Endocrine for recommendations and left my info. Nobody called me from Endocrine for a while. I found Dr Pan's earlier note that stated  she called me and unable to reach me (I am unaware of that). I called Endocrine again and after may be 40-45 min Fellow Ai called me. I told her that pt will leave I need recommendations asap. She said she will speak to her Attending and call me back. Another 40-50 min gone and she called and said her Attending gave the recommendations to my Attending. I begged her to give me the names and doses of meds that I can d/c him quick. She said she will talk to Dr Pan and call me back but never called. Time flied again. I did text Dr Fleming and begged her to help me, to call me. I asked her to give me the recommendations and I can d/c him. She said nobody spoke to her.   She told me to send him with Glipizide 5 mg bid (half the dose of home dose) and follow up with his Endocrine doctor.   Per pt he used to take Glipizide 5 mg bid, then I told him ok take the half the dose as per Dr Fleming, now he is confused and telling me that Endocrine doctor (probably Dr Pan) told him not to cont Glipizide , why she  made this decision. In the mean time he had a grave impression about me and he doesn't want to talk to me. I was trying to explain to him that as a PA I followed the Attending's decision.   His discharge was delayed tremendously.   Any way, Dr Fleming told me to send him with Prandin 0.5 mg bid before meals , send for a month supply and to give him her cell number if anything he can call her any time , I sent Prandin to his pharmacy but he was reluctant to talk to me. Any way I completed all paper works , put  d/c order and signed out to night PA.  I was so upset that everything came to me, all blames came to me, Attending yelled to me, questioning my competency  to do quick because I was in the middle, I was talking to all doctors, I was doing my job and I was talking to the pt so many times. When decided by Dr Fleming that cardiac MRI will be wo contrast by this time hours already gone--because I called several doctors, waiting for Dragan Oliva to call me back--took another hour or two, then I called renal NP Devin to find out why Dragan Oliva is not calling me , so she said she is gonna tell Dr Grande but he never called me. So I called him again and ultimately spoke to him and he said "go with contrast"-- so I ordered with contrast and came to the pt to him but he said "no,  the lady doctor told me absolutely no contrast" . I called Dr Fleming  and confirmed with her that there won't be any contrast. So I discontinued my order and the previous order is still there. I called  the Rad  (Dr Durant--[phone]) to let him know that MRI will be wo contrast. I requested him whether it can be done to-night. He said he can't do the test today--will do it tomorrow at 8 am.   I came to the pt again to tell him that the MRI will be tomorrow at 8 am.   He was angry and he wants to leave. Called Dr Fleming and said he will go on AMA. I told the pt that he needs to sign out on AMA. He got more upset angry. Called Dr Fleming again and she decided to discharge him.  I called Endocrine for recommendations and left my info. Nobody called me from Endocrine for a while. I found Dr Pan's earlier note that stated  she called me and unable to reach me (I am unaware of that). I called Endocrine again and after may be 40-45 min Fellow Ai called me. I told her that pt will leave I need recommendations asap. She said she will speak to her Attending and call me back. Another 40-50 min gone and she called and said her Attending gave the recommendations to my Attending. I begged her to give me the names and doses of meds that I can d/c him quick. She said she will talk to Dr Pan and call me back but never called. Time flied again. I did text Dr Fleming and begged her to help me, to call me. I asked her to give me the recommendations and I can d/c him. She said nobody spoke to her.   She told me to send him with Glipizide 5 mg bid (half the dose of home dose) and follow up with his Endocrine doctor.   Per pt he used to take Glipizide 5 mg bid, then I told him ok take the half the dose as per Dr Fleming, now he is confused and telling me that Endocrine doctor (probably Dr Pan) told him not to cont Glipizide , why she  made this decision. In the mean time he had a grave impression about me and he doesn't want to talk to me. I was trying to explain to him that as a PA I followed the Attending's decision.   His discharge was delayed tremendously.   Any way, Dr Fleming told me to send him with Prandin 0.5 mg bid before meals , send for a month supply and to give him her cell number if anything he can call her any time, and follow up with Susi Arce , I sent Prandin to his pharmacy but he was reluctant to talk to me. Any way I completed all paper works , put  d/c order and signed out to night PA.  I was so upset that everything came to me, all blames came to me, Attending yelled to me, questioning my competency  to do quick because I was in the middle, I was talking to all doctors, I was doing my job and I was talking to the pt so many times.

## 2021-06-30 NOTE — DISCHARGE NOTE PROVIDER - NSDCMRMEDTOKEN_GEN_ALL_CORE_FT
Allegra 180 mg oral tablet: 1 tab(s) orally once a day  amLODIPine 10 mg oral tablet: 1 tab(s) orally once a day  aspirin 81 mg oral delayed release tablet: 1 tab(s) orally once a day  ATORVASTATIN 40 MG TABLET: 1 tab(s) orally once a day  carvedilol 25 mg oral tablet: 1 tab(s) orally every 12 hours  diphenhydrAMINE 25 mg oral capsule: 1 cap(s) orally , As Needed  gabapentin 100 mg oral capsule: 1 cap(s) orally once a day  GLIPIZIDE 10 MG TABLET: 1 tab(s) orally 2 times a day  HYDROXYZINE JOHAN 50 MG CAP: 1 cap(s) orally every 6 hours, As Needed  levothyroxine 200 mcg (0.2 mg) oral tablet: 1 tab(s) orally once a day  Multiple Vitamins oral tablet: 1 tab(s) orally once a day  predniSONE 10 mg oral tablet: 3 tab(s) orally 2 times a day  RESTASIS 0.05% EYE EMULSION: 1 drop(s) to each affected eye 2 times a day, As Needed  TRIAMCINOLONE 0.1% OINTMENT: Apply topically to affected area , As Needed   Allegra 180 mg oral tablet: 1 tab(s) orally once a day  amLODIPine 10 mg oral tablet: 1 tab(s) orally once a day  aspirin 81 mg oral delayed release tablet: 1 tab(s) orally once a day  ATORVASTATIN 40 MG TABLET: 1 tab(s) orally once a day  carvedilol 25 mg oral tablet: 1 tab(s) orally every 12 hours  diphenhydrAMINE 25 mg oral capsule: 1 cap(s) orally , As Needed  gabapentin 100 mg oral capsule: 1 cap(s) orally once a day  HYDROXYZINE JOHAN 50 MG CAP: 1 cap(s) orally every 6 hours, As Needed  levothyroxine 200 mcg (0.2 mg) oral tablet: 1 tab(s) orally once a day  Multiple Vitamins oral tablet: 1 tab(s) orally once a day  predniSONE 10 mg oral tablet: 3 tab(s) orally 2 times a day  RESTASIS 0.05% EYE EMULSION: 1 drop(s) to each affected eye 2 times a day, As Needed  TRIAMCINOLONE 0.1% OINTMENT: Apply topically to affected area , As Needed   Allegra 180 mg oral tablet: 1 tab(s) orally once a day  amLODIPine 10 mg oral tablet: 1 tab(s) orally once a day  aspirin 81 mg oral delayed release tablet: 1 tab(s) orally once a day  ATORVASTATIN 40 MG TABLET: 1 tab(s) orally once a day  carvedilol 25 mg oral tablet: 1 tab(s) orally every 12 hours  diphenhydrAMINE 25 mg oral capsule: 1 cap(s) orally , As Needed  gabapentin 100 mg oral capsule: 1 cap(s) orally once a day  glipiZIDE 5 mg oral tablet: 1 tab(s) orally 2 times a day  HYDROXYZINE JOHAN 50 MG CAP: 1 cap(s) orally every 6 hours, As Needed  levothyroxine 200 mcg (0.2 mg) oral tablet: 1 tab(s) orally once a day  Multiple Vitamins oral tablet: 1 tab(s) orally once a day  predniSONE 10 mg oral tablet: 3 tab(s) orally 2 times a day  RESTASIS 0.05% EYE EMULSION: 1 drop(s) to each affected eye 2 times a day, As Needed  TRIAMCINOLONE 0.1% OINTMENT: Apply topically to affected area , As Needed   Allegra 180 mg oral tablet: 1 tab(s) orally once a day  amLODIPine 10 mg oral tablet: 1 tab(s) orally once a day  aspirin 81 mg oral delayed release tablet: 1 tab(s) orally once a day  ATORVASTATIN 40 MG TABLET: 1 tab(s) orally once a day  carvedilol 25 mg oral tablet: 1 tab(s) orally every 12 hours  diphenhydrAMINE 25 mg oral capsule: 1 cap(s) orally , As Needed  gabapentin 100 mg oral capsule: 1 cap(s) orally once a day  HYDROXYZINE JOHAN 50 MG CAP: 1 cap(s) orally every 6 hours, As Needed  levothyroxine 200 mcg (0.2 mg) oral tablet: 1 tab(s) orally once a day  Multiple Vitamins oral tablet: 1 tab(s) orally once a day  predniSONE 10 mg oral tablet: 3 tab(s) orally 2 times a day  repaglinide 0.5 mg oral tablet: 1 tab(s) orally 3 times a day (before meals)   RESTASIS 0.05% EYE EMULSION: 1 drop(s) to each affected eye 2 times a day, As Needed  TRIAMCINOLONE 0.1% OINTMENT: Apply topically to affected area , As Needed

## 2021-06-30 NOTE — PROGRESS NOTE ADULT - SUBJECTIVE AND OBJECTIVE BOX
[facility]   [phone]  RENAL FOLLOW UP NOTE  --------------------------------------------------------------------------------  Chief Complaint:    24 hour events/subjective:    seen earlier   wants to go home   states feels well     PAST HISTORY  --------------------------------------------------------------------------------  No significant changes to PMH, PSH, FHx, SHx, unless otherwise noted    ALLERGIES & MEDICATIONS  --------------------------------------------------------------------------------  Allergies    adhesives (Hives)  lisinopril (Unknown)  shellfish (Unknown)  Zosyn (Hives)    Intolerances      Standing Inpatient Medications  amLODIPine   Tablet 10 milliGRAM(s) Oral daily  aspirin enteric coated 81 milliGRAM(s) Oral daily  atorvastatin 40 milliGRAM(s) Oral at bedtime  BACItracin   Ointment 1 Application(s) Topical two times a day  carvedilol 25 milliGRAM(s) Oral every 12 hours  chlorhexidine 2% Cloths 1 Application(s) Topical daily  dextrose 40% Gel 15 Gram(s) Oral once  dextrose 5%. 1000 milliLiter(s) IV Continuous <Continuous>  dextrose 5%. 1000 milliLiter(s) IV Continuous <Continuous>  dextrose 50% Injectable 25 Gram(s) IV Push once  dextrose 50% Injectable 12.5 Gram(s) IV Push once  dextrose 50% Injectable 25 Gram(s) IV Push once  gabapentin 100 milliGRAM(s) Oral daily  glucagon  Injectable 1 milliGRAM(s) IntraMuscular once  heparin   Injectable 5000 Unit(s) SubCutaneous every 8 hours  insulin glargine Injectable (LANTUS) 16 Unit(s) SubCutaneous at bedtime  insulin lispro (ADMELOG) corrective regimen sliding scale   SubCutaneous three times a day before meals  insulin lispro (ADMELOG) corrective regimen sliding scale   SubCutaneous at bedtime  insulin lispro Injectable (ADMELOG) 10 Unit(s) SubCutaneous three times a day before meals  levothyroxine 200 MICROGram(s) Oral daily  multivitamin 1 Tablet(s) Oral daily  predniSONE   Tablet 30 milliGRAM(s) Oral two times a day    PRN Inpatient Medications      REVIEW OF SYSTEMS  --------------------------------------------------------------------------------    Gen: denies  fevers/chills,  CVS: denies chest pain/palpitations  Resp: denies SOB/Cough  GI: Denies N/V/Abd pain  : Denies dysuria      VITALS/PHYSICAL EXAM  --------------------------------------------------------------------------------  T(C): 36.6 (06-30-21 @ 17:49), Max: 37 (06-29-21 @ 23:37)  HR: 66 (06-30-21 @ 17:49) (59 - 89)  BP: 153/85 (06-30-21 @ 17:49) (147/79 - 168/96)  RR: 18 (06-30-21 @ 17:49) (18 - 18)  SpO2: 97% (06-30-21 @ 17:49) (96% - 97%)  Wt(kg): --        06-29-21 @ 07:01  -  06-30-21 @ 07:00  --------------------------------------------------------  IN: 650 mL / OUT: 0 mL / NET: 650 mL    06-30-21 @ 07:01  -  06-30-21 @ 21:11  --------------------------------------------------------  IN: 640 mL / OUT: 0 mL / NET: 640 mL      Physical Exam:  	    Gen: Non toxic comfortable appearing   	Pulm: Lungs CTA, no rales or ronchi or wheezing  	CV: No JVD. RRR, S1S2;  	Abd: +BS, soft, nontender/nondistended, RLQ scar  	: No suprapubic tenderness  	UE: Warm, no cyanosis  no clubbing,  no edema;   	LE: Warm, no cyanosis  no clubbing, no edema  	Neuro: alert and oriented. speech coherent     LABS/STUDIES  --------------------------------------------------------------------------------              11.7   8.98  >-----------<  229      [06-29-21 @ 06:50]              35.5     137  |  105  |  74  ----------------------------<  234      [06-30-21 @ 06:16]  4.2   |  17  |  2.92        Ca     8.5     [06-30-21 @ 06:16]    TPro  4.9  /  Alb  2.9  /  TBili  0.4  /  DBili  x   /  AST  15  /  ALT  23  /  AlkPhos  43  [06-29-21 @ 06:50]          Creatinine Trend:  SCr 2.92 [06-30 @ 06:16]  SCr 2.81 [06-29 @ 06:50]  SCr 2.50 [06-28 @ 06:59]  SCr 2.43 [06-27 @ 19:10]              Urinalysis - [06-29-21 @ 20:08]      Color Light Yellow / Appearance Clear / SG 1.015 / pH 6.0      Gluc 100 mg/dL / Ketone Negative  / Bili Negative / Urobili Negative       Blood Trace / Protein 300 mg/dL / Leuk Est Negative / Nitrite Negative      RBC 1 / WBC 1 / Hyaline 1 / Gran  / Sq Epi  / Non Sq Epi 0 / Bacteria Negative    Urine Creatinine 98      [06-29-21 @ 20:08]  Urine Protein 160      [06-29-21 @ 20:08]    TSH 0.02      [06-29-21 @ 10:23]

## 2021-06-30 NOTE — DISCHARGE NOTE PROVIDER - HOSPITAL COURSE
51 yo M with PMH of HTN, HLD, Hypothyroidism, DMII, Renal cell ca s/p right nephrectomy, currently on prednisone, CKD, ?irregular heart beats, presents here after syncopal episode.  Patient was at a [employer] party today when he started feeling sweaty, lightheaded, overall not feeling well.  He then lay down on the couch to rest, next thing he remembers is EMS was there trying to wake up him.  He was told that he passed out.  His finger stick at the time was 51.  In ED, patient had another episode of sweating and lightheadedness.  Repeat blood sugar was 36.  Patient was then given 1L D5 NS, octreotide.  Currently patient denies any complaints.  His FS at this time is 128.  Patient otherwise denies fever, chills, cough, SOB, nausea, vomiting, diarrhea.  He never had syncopal episode in the past.  He denies having chest pain, palpitation, prior to the syncopal episode.    He was seen by card, Endocrine, Renal. He was scheduled for Cardiac MRI today wo contrast. After discussion among Radiologist, Cardiologist , Nephrologist and Medicine Attending it was decided that Cardiac MRI will be without contrast and will be done at 8 am tomorrow. Dr Fleming tried to have done it tonight but failed. Pt is upset and he wants to be discharged now. Spoke to Dr Fleming. He will leave now. 49 yo M with PMH of HTN, HLD, Hypothyroidism, DMII, Renal cell ca s/p right nephrectomy, currently on prednisone, CKD, ?irregular heart beats, presents here after syncopal episode.  Patient was at a [employer] party today when he started feeling sweaty, lightheaded, overall not feeling well.  He then lay down on the couch to rest, next thing he remembers is EMS was there trying to wake up him.  He was told that he passed out.  His finger stick at the time was 51.  In ED, patient had another episode of sweating and lightheadedness.  Repeat blood sugar was 36.  Patient was then given 1L D5 NS, octreotide.  Currently patient denies any complaints.  His FS at this time is 128.  Patient otherwise denies fever, chills, cough, SOB, nausea, vomiting, diarrhea.  He never had syncopal episode in the past.  He denies having chest pain, palpitation, prior to the syncopal episode.    He was seen by card, Endocrine, Renal. He was scheduled for Cardiac MRI today wo contrast. After discussion among Radiologist, Cardiologist , Nephrologist and Medicine Attending it was decided that Cardiac MRI will be without contrast and will be done at 8 am tomorrow. I requested Radiologist whether it can be done tonight -- he said he will do it as first case 2  8 am. Dr Fleming tried to have done it tonight but failed. Pt is upset and he wants to be discharged now. Spoke to Dr Fleming. He will leave now. 51 yo M with PMH of HTN, HLD, Hypothyroidism, DMII, Renal cell ca s/p right nephrectomy, currently on prednisone, CKD, ?irregular heart beats, presents here after syncopal episode.  Patient was at a [employer] party today when he started feeling sweaty, lightheaded, overall not feeling well.  He then lay down on the couch to rest, next thing he remembers is EMS was there trying to wake up him.  He was told that he passed out.  His finger stick at the time was 51.  In ED, patient had another episode of sweating and lightheadedness.  Repeat blood sugar was 36.  Patient was then given 1L D5 NS, octreotide.  Currently patient denies any complaints.  His FS at this time is 128.  Patient otherwise denies fever, chills, cough, SOB, nausea, vomiting, diarrhea.  He never had syncopal episode in the past.  He denies having chest pain, palpitation, prior to the syncopal episode.    He was seen by card, Endocrine, Renal. He was scheduled for Cardiac MRI today wo contrast. After discussion among Radiologist, Cardiologist , Nephrologist and Medicine Attending it was decided that Cardiac MRI will be without contrast and will be done at 8 am tomorrow. I requested Radiologist whether it can be done tonight -- he said he will do it as first case 2  8 am. Dr Fleming tried to have done it tonight but failed. Pt is upset and he wants to be discharged now. Spoke to Dr Fleming. He will leave now. Endocrine was called and waiting for recommendations.

## 2021-06-30 NOTE — CHART NOTE - NSCHARTNOTEFT_GEN_A_CORE
6/30	Spoke to Rad Dr Durant regarding contrast for card MRI, he spoke to Card who set up with Gadolinium but we need Renal clearance. Dragan Oliva  ( renal ) was called--waiting for call back.   Addendum-- spoke to Dragan Oliva--ok to give Gadolinium, MRI reordered, spoke to Rad.    [facility] [place] # [id number] 6/30	Spoke to Rad Dr Durant regarding contrast for card MRI, he spoke to Card who set up with Gadolinium but we need Renal clearance. Dragan Oliva  ( renal ) was called--waiting for call back.   Addendum-- spoke to Dragan Oliva--ok to give Gadolinium, MRI reordered, spoke to Rad. But Dr Fleming wo contrast--spoke to Rad    [facility] [place] # [id number] 6/30	Spoke to Rad Dr Durant regarding contrast for card MRI, he spoke to Card who set up with Gadolinium but we need Renal clearance. Dragan Oliva  ( renal ) was called--waiting for call back.   Addendum-- spoke to Dragan Oliva--ok to give Gadolinium, MRI reordered, spoke to Rad. But Dr Fleming wants wo contrast--spoke to [facility] [place] [facility] # [id number]

## 2021-06-30 NOTE — CHART NOTE - NSCHARTNOTEFT_GEN_A_CORE
[facility] MEDICINE NOTE:     Patient already for discharge at 8pm.  Endo fellow called around 8:45pm and recommended patient to go home with basal + oral regimen.   As per endo note "- At this time would recommend discharging patient on Lantus 16 units qhs."  Spoke with  and she mentioned to discharge pt only on Prandin or now and she will speak to the patient tomorrow morning.        Guillaume Basurto  Dept of Medicine   #[id number] [place] MEDICINE NOTE:     Patient already for discharge at 8pm.  Endo fellow called around 8:45pm and recommended patient to go home with basal + oral regimen.   As per endo note "- At this time would recommend discharging patient on Lantus 16 units qhs."  Spoke with  and she mentioned to discharge pt only on Prandin for now and she will speak to the patient tomorrow morning.        Guillaume Basurto  Dept of Medicine   #[id number]

## 2021-06-30 NOTE — CHART NOTE - NSCHARTNOTEFT_GEN_A_CORE
Endocrine On call paged for discharge recommendations.  Patient currently on Prednisone 30mg BID.  On Lantus 16 units qhs and Admelog 10 AC meals  - Ideally at this time patient should be discharged on basal + oral regimen  - At this time would recommend discharging patient on Lantus 16 units qhs. Patient will need insulin pen and glucometer teaching. Supplies will need to be sent to pharmacy (pen needles, lancets, glucose strips, alcohol swabs)  - Can start Prandin 0.5mg TID with meals  - Patient should monitor BG at home and call provider if he is noting any hypoglycemic events <70 or persistently elevated BG >250  - Patient should f/u with outpatient PCP or Endocrinologist to titrate regimen pending his steroid taper.  - Discussed in length with night team

## 2021-06-30 NOTE — PROGRESS NOTE ADULT - PROBLEM SELECTOR PROBLEM 3
Essential hypertension
Chronic kidney disease (CKD)
Chronic kidney disease (CKD)
Essential hypertension

## 2021-06-30 NOTE — PROGRESS NOTE ADULT - ASSESSMENT
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 Endocrine consulted for hypoglycemia.  please note pt is currently on prednisone as well (30mg BID)      1. DM   check FSBG TID qac and hs  carb consistent diet   - Lantus 18 u qhs  -Humalog 10-10-10 if dinner is tightly controlled <100, please decrease to 8 premeals   -low dose SS TIDAC/qhs  HbA1c is 6.2%, so he probably has frequent hypoglycemia.    inform endocrine of hypoglycemia or persistent hyperglycemia episodes as changes in pts insulin regimen will need to be made.   notify endocrine if any plans to be NPO/diet changes as this will also affect insulin regimen.  also inform endocrine if any plans to decrease steroids       DISPO: TBD based on inpt requirements and steroids plan,  likely  prandin+DPP4i  F/u with his PCP.      2. hypothyroidism   -check 0.02 and FT4 is 1.4.   If patient's hypothyroidism if from his thyroid then his dose should be adjusted but if its from his pituitary, continue as is.   Dr. Lundberg has tried to called his outpatient doctor: Dr. Kennedy [phone] to clarify but got a random vm.   -continue Synthroid 200mcg po qdaily - an hour before he eats/takes meds.  however if it is possible please find out from pts MD etiology of hypothyroidism as it can affect his dosing   if unable to, pt will need to have repeat TFTS in 4 weeks of dc and followup with his doctor       3. HTN  -BP goal in DM  130/80.   pt is on amlodipine and losartan. 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 Endocrine consulted for hypoglycemia.  please note pt is currently on prednisone as well (30mg BID)      1. DM   check FSBG TID qac and hs  carb consistent diet   - Lantus 16 u qhs; if am glcuose high tomorrow will increase to 18, for now trend given that he is tightly controlled this afternoon   -Humalog 10-10-10 if dinner is tightly controlled <100, please decrease to 8 premeals   -low dose SS TIDAC/qhs  HbA1c is 6.2%, so he probably has frequent hypoglycemia.    inform endocrine of hypoglycemia or persistent hyperglycemia episodes as changes in pts insulin regimen will need to be made.   notify endocrine if any plans to be NPO/diet changes as this will also affect insulin regimen.  also inform endocrine if any plans to decrease steroids       DISPO: TBD based on inpt requirements and steroids plan,  likely  prandin+DPP4i  F/u with his PCP.      2. hypothyroidism   -check 0.02 and FT4 is 1.4.   If patient's hypothyroidism if from his thyroid then his dose should be adjusted but if its from his pituitary, continue as is.   Dr. Lundberg has tried to called his outpatient doctor: Dr. Kennedy [phone] to clarify but got a random vm.   -continue Synthroid 200mcg po qdaily - an hour before he eats/takes meds.  however if it is possible please find out from pts MD etiology of hypothyroidism as it can affect his dosing   if unable to, pt will need to have repeat TFTS in 4 weeks of dc and followup with his doctor       3. HTN  -BP goal in DM  130/80.   pt is on amlodipine and losartan.      called NP Cristóbal, was unable to reach     Sonia Pan MD  Attending Physician   Department of Endocrinology, Diabetes and Metabolism    [phone]  Please note that this patient may be followed by a different provider tomorrow. If no answer or after hours, please contact [phone].  For final dc reccomendations, please call [phone] or page the endocrine fellow on call.

## 2021-06-30 NOTE — PROGRESS NOTE ADULT - SUBJECTIVE AND OBJECTIVE BOX
Chief Complaint/Follow-up on: DM    Subjective:  POC blood glucose and insulin use reviewed.  tolerating PO  no n/v   ambulating         MEDICATIONS  (STANDING):  amLODIPine   Tablet 10 milliGRAM(s) Oral daily  aspirin enteric coated 81 milliGRAM(s) Oral daily  atorvastatin 40 milliGRAM(s) Oral at bedtime  BACItracin   Ointment 1 Application(s) Topical two times a day  carvedilol 25 milliGRAM(s) Oral every 12 hours  chlorhexidine 2% Cloths 1 Application(s) Topical daily  dextrose 40% Gel 15 Gram(s) Oral once  dextrose 5%. 1000 milliLiter(s) (100 mL/Hr) IV Continuous <Continuous>  dextrose 5%. 1000 milliLiter(s) (50 mL/Hr) IV Continuous <Continuous>  dextrose 50% Injectable 25 Gram(s) IV Push once  dextrose 50% Injectable 12.5 Gram(s) IV Push once  dextrose 50% Injectable 25 Gram(s) IV Push once  gabapentin 100 milliGRAM(s) Oral daily  glucagon  Injectable 1 milliGRAM(s) IntraMuscular once  heparin   Injectable 5000 Unit(s) SubCutaneous every 8 hours  insulin glargine Injectable (LANTUS) 16 Unit(s) SubCutaneous at bedtime  insulin lispro (ADMELOG) corrective regimen sliding scale   SubCutaneous three times a day before meals  insulin lispro (ADMELOG) corrective regimen sliding scale   SubCutaneous at bedtime  insulin lispro Injectable (ADMELOG) 10 Unit(s) SubCutaneous three times a day before meals  levothyroxine 200 MICROGram(s) Oral daily  multivitamin 1 Tablet(s) Oral daily  predniSONE   Tablet 30 milliGRAM(s) Oral two times a day    MEDICATIONS  (PRN):      PHYSICAL EXAM:  VITALS: T(C): 36.5 (06-30-21 @ 09:20)  T(F): 97.7 (06-30-21 @ 09:20), Max: 98.6 (06-29-21 @ 23:37)  HR: 89 (06-30-21 @ 09:20) (59 - 89)  BP: 147/79 (06-30-21 @ 09:20) (147/79 - 168/96)  RR:  (18 - 18)  SpO2:  (96% - 98%)  Wt(kg): --  GENERAL: NAD,   EYES: No proptosis, no injection  RESPIRATORY: no respiratory distress   CARDIOVASCULAR: Regular rate and rhythm  neuro: alert and oriented    POCT Blood Glucose.: 94 mg/dL (06-30-21 @ 12:05)  POCT Blood Glucose.: 147 mg/dL (06-30-21 @ 07:39)  POCT Blood Glucose.: 237 mg/dL (06-29-21 @ 21:17)  POCT Blood Glucose.: 190 mg/dL (06-29-21 @ 17:07)  POCT Blood Glucose.: 250 mg/dL (06-29-21 @ 11:55)  POCT Blood Glucose.: 225 mg/dL (06-29-21 @ 08:44)  POCT Blood Glucose.: 189 mg/dL (06-29-21 @ 06:10)  POCT Blood Glucose.: 210 mg/dL (06-29-21 @ 02:12)  POCT Blood Glucose.: 225 mg/dL (06-28-21 @ 20:56)  POCT Blood Glucose.: 304 mg/dL (06-28-21 @ 17:01)  POCT Blood Glucose.: 277 mg/dL (06-28-21 @ 12:19)  POCT Blood Glucose.: 155 mg/dL (06-28-21 @ 07:59)  POCT Blood Glucose.: 204 mg/dL (06-28-21 @ 03:16)  POCT Blood Glucose.: 207 mg/dL (06-28-21 @ 00:52)  POCT Blood Glucose.: 70 mg/dL (06-28-21 @ 00:10)  POCT Blood Glucose.: 128 mg/dL (06-27-21 @ 22:57)  POCT Blood Glucose.: 210 mg/dL (06-27-21 @ 20:52)  POCT Blood Glucose.: 36 mg/dL (06-27-21 @ 20:31)  POCT Blood Glucose.: 136 mg/dL (06-27-21 @ 18:41)  POCT Blood Glucose.: 185 mg/dL (06-27-21 @ 18:27)    06-30    137  |  105  |  74<H>  ----------------------------<  234<H>  4.2   |  17<L>  |  2.92<H>    EGFR if : 28<L>  EGFR if non : 24<L>    Ca    8.5      06-30  Mg     2.1     06-28  Phos  4.4     06-28    TPro  4.9<L>  /  Alb  2.9<L>  /  TBili  0.4  /  DBili  x   /  AST  15  /  ALT  23  /  AlkPhos  43  06-29          Thyroid Function Tests:  06-29 @ 10:23 TSH 0.02 FreeT4 1.4 T3 -- Anti TPO -- Anti Thyroglobulin Ab -- TSI --

## 2021-06-30 NOTE — PROGRESS NOTE ADULT - PROBLEM SELECTOR PROBLEM 1
Hypoglycemia due to type 2 diabetes mellitus
Hypoglycemia due to type 2 diabetes mellitus
Hypoglycemia
Hypoglycemia

## 2021-06-30 NOTE — DISCHARGE NOTE NURSING/CASE MANAGEMENT/SOCIAL WORK - NSDPLANG ASIS_GEN_ALL_CORE
"Goal Outcome Evaluation:  Plan of Care Reviewed With: patient  Progress: no change  Outcome Summary: Pt in bed resting. Cluster care provided by staff to promote sleep. When 1 am antibiotic was due pt was upset and stated \"I have nothing due. My doctor said i will not be woke up between midnight and 6 AM\" Educated on the order time of the antibiotic and the antibiotic before not being compatible together and needing to be ran in the IV at seperate times. PT agreed to take antibiotic after education. VS stable. Will continue to cluster care and monitor.  " No

## 2021-06-30 NOTE — DISCHARGE NOTE PROVIDER - NSDCCPCAREPLAN_GEN_ALL_CORE_FT
PRINCIPAL DISCHARGE DIAGNOSIS  Diagnosis: Hypoglycemia  Assessment and Plan of Treatment: resolved      SECONDARY DISCHARGE DIAGNOSES  Diagnosis: Syncope  Assessment and Plan of Treatment: resolved     PRINCIPAL DISCHARGE DIAGNOSIS  Diagnosis: Hypoglycemia  Assessment and Plan of Treatment: resolved      SECONDARY DISCHARGE DIAGNOSES  Diagnosis: DM2 (diabetes mellitus, type 2)  Assessment and Plan of Treatment: stable, started with short and long acting insulin, oral agent on hold.    Diagnosis: Syncope  Assessment and Plan of Treatment: resolved     PRINCIPAL DISCHARGE DIAGNOSIS  Diagnosis: Hypoglycemia  Assessment and Plan of Treatment: resolved      SECONDARY DISCHARGE DIAGNOSES  Diagnosis: Hypothyroidism  Assessment and Plan of Treatment: stable, cont current home meds    Diagnosis: Essential hypertension  Assessment and Plan of Treatment: stable, cont current home meds except losartan, follow up with PCP    Diagnosis: DM2 (diabetes mellitus, type 2)  Assessment and Plan of Treatment: stable, started with short and long acting insulin, oral agent on hold.    Diagnosis: Syncope  Assessment and Plan of Treatment: resolved     PRINCIPAL DISCHARGE DIAGNOSIS  Diagnosis: Hypoglycemia  Assessment and Plan of Treatment: resolved      SECONDARY DISCHARGE DIAGNOSES  Diagnosis: Hypothyroidism  Assessment and Plan of Treatment: stable, cont current home meds, you need to check TFTs in 4 weeks    Diagnosis: Essential hypertension  Assessment and Plan of Treatment: stable, cont current home meds except losartan, follow up with PCP    Diagnosis: DM2 (diabetes mellitus, type 2)  Assessment and Plan of Treatment: stable, started with short and long acting insulin, take glipizide 5 mg twice daily for now and follow up with your Endocrine doctor.    Diagnosis: Syncope  Assessment and Plan of Treatment: resolved     PRINCIPAL DISCHARGE DIAGNOSIS  Diagnosis: Hypoglycemia  Assessment and Plan of Treatment: resolved      SECONDARY DISCHARGE DIAGNOSES  Diagnosis: Hypothyroidism  Assessment and Plan of Treatment: stable, cont current home meds, you need to check TFTs in 4 weeks    Diagnosis: Essential hypertension  Assessment and Plan of Treatment: stable, cont current home meds except losartan, follow up with PCP    Diagnosis: DM2 (diabetes mellitus, type 2)  Assessment and Plan of Treatment: stable, started with short and long acting insulin, take Prandin 0.5 mg three times a day before meals now and follow up with  Endocrine doctor.    Diagnosis: Syncope  Assessment and Plan of Treatment: resolved

## 2021-06-30 NOTE — DISCHARGE NOTE PROVIDER - PROVIDER TOKENS
FREE:[LAST:[Criss],FIRST:[Adrian],PHONE:[(   )    -],FAX:[(   )    -],ADDRESS:[Nephrologist]],PROVIDER:[TOKEN:[[id number]]] PROVIDER:[TOKEN:[[id number]]],PROVIDER:[TOKEN:[[id number]]],PROVIDER:[TOKEN:[[id number]]]

## 2021-06-30 NOTE — PROGRESS NOTE ADULT - ASSESSMENT
50 year old Male with PMH of HTN, HLD, Hypothyroidism, DMII, Renal cell ca s/p right nephrectomy, currently on prednisone, CKD, ?irregular heart beats, presents here after syncopal episode.     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   on coreg 25 mg BID  insulin sliding scale.  endo following

## 2022-05-23 NOTE — PATIENT PROFILE ADULT - NSPROHMDIABETMGMTSTRAT_GEN_A_NUR
RX auth received for refill for:  montelukast (SINGULAIR) 10 MG tablet 90 tablet 0 2/9/2022     Sig - Route: Take 1 tablet by mouth nightly - Oral    Sent to pharmacy as: Montelukast Sodium 10 MG Oral Tablet (SINGULAIR)    Class: Eprescribe            Last filled: 2/9/22  Qty: 90  Last seen:  12/10/21  Next visit: 6/20/22    Refilled per protocol.  
medication therapy
